# Patient Record
Sex: FEMALE | Race: WHITE | NOT HISPANIC OR LATINO | Employment: OTHER | URBAN - NONMETROPOLITAN AREA
[De-identification: names, ages, dates, MRNs, and addresses within clinical notes are randomized per-mention and may not be internally consistent; named-entity substitution may affect disease eponyms.]

---

## 2017-01-24 ENCOUNTER — OFFICE VISIT (OUTPATIENT)
Dept: FAMILY MEDICINE CLINIC | Facility: CLINIC | Age: 82
End: 2017-01-24

## 2017-01-24 VITALS
HEART RATE: 62 BPM | BODY MASS INDEX: 27.75 KG/M2 | HEIGHT: 61 IN | SYSTOLIC BLOOD PRESSURE: 136 MMHG | WEIGHT: 147 LBS | DIASTOLIC BLOOD PRESSURE: 60 MMHG

## 2017-01-24 DIAGNOSIS — F41.1 GENERALIZED ANXIETY DISORDER: Chronic | ICD-10-CM

## 2017-01-24 DIAGNOSIS — D50.8 IRON DEFICIENCY ANEMIA SECONDARY TO INADEQUATE DIETARY IRON INTAKE: Chronic | ICD-10-CM

## 2017-01-24 DIAGNOSIS — K21.9 GASTROESOPHAGEAL REFLUX DISEASE WITHOUT ESOPHAGITIS: Chronic | ICD-10-CM

## 2017-01-24 DIAGNOSIS — E78.5 HYPERLIPIDEMIA: ICD-10-CM

## 2017-01-24 DIAGNOSIS — M15.9 PRIMARY OSTEOARTHRITIS INVOLVING MULTIPLE JOINTS: Chronic | ICD-10-CM

## 2017-01-24 DIAGNOSIS — E78.2 MIXED HYPERLIPIDEMIA: Chronic | ICD-10-CM

## 2017-01-24 DIAGNOSIS — I10 ESSENTIAL HYPERTENSION: Primary | Chronic | ICD-10-CM

## 2017-01-24 PROBLEM — H40.9 GLAUCOMA: Chronic | Status: ACTIVE | Noted: 2017-01-24

## 2017-01-24 PROBLEM — H35.30 MACULAR DEGENERATION: Chronic | Status: ACTIVE | Noted: 2017-01-24

## 2017-01-24 PROCEDURE — 99214 OFFICE O/P EST MOD 30 MIN: CPT | Performed by: INTERNAL MEDICINE

## 2017-01-24 RX ORDER — ROSUVASTATIN CALCIUM 5 MG/1
TABLET, COATED ORAL
Qty: 30 TABLET | Refills: 5 | Status: SHIPPED | OUTPATIENT
Start: 2017-01-24 | End: 2017-04-10 | Stop reason: SDUPTHER

## 2017-01-24 RX ORDER — DORZOLAMIDE HCL 20 MG/ML
1 SOLUTION/ DROPS OPHTHALMIC 2 TIMES DAILY
Refills: 4 | COMMUNITY
Start: 2017-01-19 | End: 2017-07-24

## 2017-01-24 RX ORDER — LISINOPRIL 10 MG/1
10 TABLET ORAL DAILY
Qty: 30 TABLET | Refills: 5 | Status: SHIPPED | OUTPATIENT
Start: 2017-01-24 | End: 2017-06-22 | Stop reason: SDUPTHER

## 2017-01-24 NOTE — PROGRESS NOTES
Subjective   Emily Valdez is a 89 y.o. female who presents to the office for follow-up and review of labs. She has hypertension and her blood pressure has been controlled. She has hyperlipidemia and takes Crestor daily. She has GERD and takes Protonix and Zantac.  She has frequent episodes of nausea and increased flatulence. She takes Phenergan and Librax as needed, and this tends to help with her GI symptoms.  She has macular degeneration and continues to lose her eyesight.  This often causes a lot of stress and anxiety.  She has iron deficiency anemia, but does not currently take an iron supplement.  She has osteoarthritis and takes over-the-counter NSAIDs as needed.    History of Present Illness     The following portions of the patient's history were reviewed and updated as appropriate: allergies, current medications, past family history, past medical history, past social history, past surgical history and problem list.    Review of Systems   Constitutional: Negative for chills, fatigue and fever.   HENT: Negative for congestion, sneezing, sore throat and trouble swallowing.    Eyes: Negative for visual disturbance.   Respiratory: Negative for cough, chest tightness, shortness of breath and wheezing.    Cardiovascular: Negative for chest pain, palpitations and leg swelling.   Gastrointestinal: Positive for nausea. Negative for constipation, diarrhea and vomiting.   Genitourinary: Negative for dysuria, frequency and urgency.   Musculoskeletal: Negative for neck pain.   Skin: Negative for rash.   Neurological: Negative for dizziness, weakness and headaches.   Psychiatric/Behavioral:        Patient denies any feelings of depression and has not felt down, hopeless or lost interest in any activities.       Objective   Physical Exam   Constitutional: She is oriented to person, place, and time. She appears well-developed and well-nourished.   HENT:   Head: Normocephalic and atraumatic.   Nose: Nose normal.    Mouth/Throat: Oropharynx is clear and moist. No oropharyngeal exudate.   Eyes: Conjunctivae and EOM are normal. Pupils are equal, round, and reactive to light. Right eye exhibits no discharge. Left eye exhibits no discharge. No scleral icterus.   She is wearing dark sunglasses secondary to her macular degeneration and glaucoma   Neck: Normal range of motion. Neck supple. No thyromegaly present.   Cardiovascular: Normal rate, regular rhythm, normal heart sounds and intact distal pulses.  Exam reveals no gallop and no friction rub.    No murmur heard.  Pulmonary/Chest: Effort normal and breath sounds normal. No respiratory distress. She has no wheezes. She has no rales.   Abdominal: Soft. Bowel sounds are normal. She exhibits no distension. There is no tenderness. There is no rebound and no guarding.   Musculoskeletal: She exhibits no edema.   Lymphadenopathy:     She has no cervical adenopathy.   Neurological: She is alert and oriented to person, place, and time. No cranial nerve deficit. Gait abnormal.   Patient walks with the aid of a walker   Skin: Skin is warm and dry. No rash noted.   Psychiatric: She has a normal mood and affect. Her behavior is normal. Judgment and thought content normal.   Nursing note and vitals reviewed.      Assessment/Plan   Emily was seen today for hypertension.    Diagnoses and all orders for this visit:    Essential hypertension  -     lisinopril (PRINIVIL,ZESTRIL) 10 MG tablet; Take 1 tablet by mouth Daily.  -     CBC & Differential; Future  -     Comprehensive Metabolic Panel; Future  -     T4, Free; Future  -     TSH; Future  -     Urinalysis With / Culture If Indicated; Future    Mixed hyperlipidemia  -     LDL Cholesterol, Direct; Future    Gastroesophageal reflux disease without esophagitis    Generalized anxiety disorder    Primary osteoarthritis involving multiple joints    Iron deficiency anemia secondary to inadequate dietary iron intake         Labs are reviewed with  patient.  Her anemia is stable.  I have recommended taking an over-the-counter iron supplement.  Her lipids are controlled and she will continue with Crestor.  Her blood pressure is controlled.  She will continue with Phenergan and Librax as needed for her GI symptoms.  She will also continue with Zantac and Protonix for her GERD symptoms. She will continue with Tylenol or other OTC NSAID's as needed for her arthritic pain.    PHQ-9 Depression Screen was performed on patient with a score of 7.    The patient also states that she will be getting new hearing aids in the future.  She is needing medical clearance for this.  I have asked her to have the audiologist fax me the medical clearance form.  I will then complete it and fax it back to the hearing aid center.      Hospital Outpatient Visit on 01/19/2017   Component Date Value Ref Range Status   • WBC 01/19/2017 7.1  3.2 - 9.8 x1000/uL Final   • RBC 01/19/2017 3.77  3.77 - 5.16 ju/mm3 Final   • Hemoglobin 01/19/2017 11.4* 12.0 - 15.5 gm/dl Final   • Hematocrit 01/19/2017 35.0  35.0 - 45.0 % Final   • MCV 01/19/2017 92.8  80.0 - 98.0 fl Final   • MCH 01/19/2017 30.2  26.0 - 34.0 pg Final   • MCHC 01/19/2017 32.6  31.4 - 36.0 gm/dl Final   • Platelets 01/19/2017 179  150 - 450 x1000/mm3 Final   • RDW 01/19/2017 13.8  11.5 - 14.5 % Final   • MPV 01/19/2017 11.3  8.0 - 12.0 fl Final   • Neutrophil Rel % 01/19/2017 51.4  37.0 - 80.0 % Final   • Lymphocyte Rel % 01/19/2017 31.6  10.0 - 50.0 % Final   • Monocyte Rel % 01/19/2017 8.6  0.0 - 12.0 % Final   • Eosinophil Rel % 01/19/2017 7.7* 0.0 - 7.0 % Final   • Basophil Rel % 01/19/2017 0.7  0.0 - 2.0 % Final   • nRBC 01/19/2017 0   Final   • nRBC 01/19/2017 0   Final   • Color, UA 01/19/2017 Yellow* STRAW,YELLOW,DK YELLOW,ALLAN Final   • Appearance 01/19/2017 Turbid* CLEAR Final   • Glucose, Urine 01/19/2017 NEGATIVE  NEGATIVE mg/dl Final   • Ketones, UA 01/19/2017 NEGATIVE  NEGATIVE Final   • Specific [place], UA  01/19/2017 1.020  1.003 - 1.030 Final   • Blood, UA 01/19/2017 TRACE* NEGATIVE Final   • pH, UA 01/19/2017 7.5* 5.5 - 8.0 pH Units Final    pH Normal: 5.0 - 9.0    • Protein, UA 01/19/2017 NEGATIVE  NEGATIVE Final   • Urobilinogen, UA 01/19/2017 0.2  0.2 EU/dl Final   • Nitrite, UA 01/19/2017 POSITIVE* NEGATIVE Final   • Leukocytes, UA 01/19/2017 2+* NEGATIVE Final   • WBC, UA 01/19/2017 31-50* NONE SEEN,0-2,3-5  /HPF Final   • RBC, UA 01/19/2017 3-5  NONE SEEN,0-2,3-5  /HPF Final   • Bacteria, UA 01/19/2017 2+* NONE SEEN  /HPF Final   • Mucus, UA 01/19/2017 NEGATIVE   Final   • Total Cholesterol 01/19/2017 130* 150 - 200 mg/dl Final    CHOL DESIRED: < 200 MG/DL   • Triglycerides 01/19/2017 96  35 - 160 mg/dl Final    TRIG DESIRED: < 200 MG/DL   • HDL Cholesterol 01/19/2017 52.6  35.0 - 100.0 mg/dl Final    HDL AVERAGE RISK: 35 - 60 MG/DL   • LDL Cholesterol  01/19/2017 58  mg/dl Final    Comment: LDL DESIRED: < 130 MG/DL  LDL DESIRED: < 130 MG/DL  LDL DESIRED: < 130 MG/DL     • Glucose 01/19/2017 97  70.0 - 100.0 mg/dl Final   • BUN 01/19/2017 25  8.0 - 25.0 mg/dl Final   • Creatinine 01/19/2017 1.2  0.4 - 1.3 mg/dl Final   • Sodium 01/19/2017 141.0  134 - 146 mmol/L Final   • Potassium 01/19/2017 5.2  3.4 - 5.4 mmol/L Final   • Chloride 01/19/2017 106.0  100.0 - 112.0 mmol/L Final   • CO2 01/19/2017 28.0  20.0 - 32.0 mmol/L Final   • Calcium 01/19/2017 9.4  8.4 - 10.8 mg/dl Final   • Total Protein 01/19/2017 7.1  6.7 - 8.2 gm/dl Final   • Albumin 01/19/2017 3.9  3.2 - 5.5 gm/dl Final   • Total Bilirubin 01/19/2017 0.7  0.2 - 1.0 mg/dl Final   • Alkaline Phosphatase 01/19/2017 57  15 - 121 U/L Final   • ALT (SGPT) 01/19/2017 11  10 - 60 U/L Final   • AST (SGOT) 01/19/2017 21  10 - 60 U/L Final   • GFR MDRD Non  01/19/2017 42  39 - 90 mL/min/1.73 sq.M Final    Comment: Invalid if creatinine is changing or the patient is on dialysis. Use AA  result if patient is -American, non AA result  otherwise.     • GFR MDRD  01/19/2017 51  39 - 90 mL/min/1.73 sq.M Final   • Anion Gap 01/19/2017 7.0  5.0 - 15.0 mmol/L Final   Hospital Outpatient Visit on 01/19/2017   Component Date Value Ref Range Status   • Free T4 01/19/2017 1.09  0.78 - 2.19 ng/dl Final   • TSH 01/19/2017 1.84  0.46 - 4.68 uIU/ml Final   ]

## 2017-01-24 NOTE — MR AVS SNAPSHOT
Emily Valdez   1/24/2017 11:30 AM   Office Visit    Dept Phone:  [phone]   Encounter #:  [id number]    Provider:  Vaibhav Mai MD   Department:  [facility]                Your Full Care Plan              Today's Medication Changes          These changes are accurate as of: 1/24/17 12:26 PM.  If you have any questions, ask your nurse or doctor.               Medication(s)that have changed:     rosuvastatin 5 MG tablet   Commonly known as:  CRESTOR   TAKE 1 TABLET BY MOUTH EVERY EVENING   What changed:  See the new instructions.   Changed by:  Vaibhav Mai MD         Stop taking medication(s)listed here:     acetaminophen 500 MG tablet   Commonly known as:  TYLENOL   Stopped by:  Vaibhav Mai MD           BIOTIN PO   Stopped by:  Vaibhav Mai MD                Where to Get Your Medications      These medications [facility] sent to [facility] [address] - [phone] [facility] [phone]   [address] [facility] [address]     Phone:  [phone]     lisinopril 10 MG tablet    rosuvastatin 5 MG tablet                  Your Updated Medication List          This list is accurate as of: 1/24/17 12:26 PM.  Always use your most recent med list.                clidinium-chlordiazePOXIDE 5-2.5 MG per capsule   Commonly known as:  LIBRAX   Take 1 capsule by mouth two times daily       COMBIGAN 0.2-0.5 % ophthalmic solution   Generic drug:  brimonidine-timolol       dorzolamide 2 % ophthalmic solution   Commonly known as:  TRUSOPT       Fish Oil oil       lisinopril 10 MG tablet   Commonly known as:  PRINIVIL,ZESTRIL   Take 1 tablet by mouth Daily.       LUMIGAN 0.01 % ophthalmic drops   Generic drug:  bimatoprost       magnesium oxide 400 (241.3 MG) MG tablet tablet   Commonly known as:  MAGOX   Take 1 tablet by mouth 2 (two) times a day.       metoprolol succinate XL 25 MG 24 hr tablet   Commonly known  as:  TOPROL-XL   Take 1 tablet by mouth daily.       pantoprazole 40 MG EC tablet   Commonly known as:  PROTONIX   Take 1 tablet by mouth daily.       promethazine 25 MG tablet   Commonly known as:  PHENERGAN   TAKE 1 TABLET BY MOUTH EVERY 6 HOURS AS NEEDED FOR NAUSEA       raNITIdine 150 MG tablet   Commonly known as:  ZANTAC   Take 1 tablet by mouth 2 (two) times a day.       rosuvastatin 5 MG tablet   Commonly known as:  CRESTOR   TAKE 1 TABLET BY MOUTH EVERY EVENING               You Were Diagnosed With        Codes Comments    Essential hypertension    -  Primary ICD-10-CM: I10  ICD-9-CM: 401.9     Mixed hyperlipidemia     ICD-10-CM: E78.2  ICD-9-CM: 272.2     Gastroesophageal reflux disease without esophagitis     ICD-10-CM: K21.9  ICD-9-CM: 530.81     Generalized anxiety disorder     ICD-10-CM: F41.1  ICD-9-CM: 300.02     Primary osteoarthritis involving multiple joints     ICD-10-CM: M15.0  ICD-9-CM: 715.09     Iron deficiency anemia secondary to inadequate dietary iron intake     ICD-10-CM: D50.8  ICD-9-CM: 280.1       Instructions     None    Patient Instructions History      Upcoming Appointments     Visit Type Date Time Department    FOLLOW UP 1/24/2017 11:30 AM [facility]    FOLLOW UP 7/24/2017  1:00 PM [facility]      MyChart Signup     Our records indicate that you have declined [facility]. If you would like to sign up for [employer], please email [email or web address] or call [phone] to obtain an activation code.             Other Info from Your Visit           Your Appointments     Jul 24, 2017  1:00 PM CDT   Follow Up with Vaibhav Mai MD   [facility] (--)    [address] [place] [address] [address] [place] [address] [phone]           Arrive 15 minutes prior to appointment.              Allergies     Codeine      Levaquin [Levofloxacin]      Sulfa Antibiotics        Reason for Visit     Hypertension           Vital Signs      "Blood Pressure Pulse Height Weight Body Mass Index Smoking Status    136/60 (BP Location: Left arm, Patient Position: Sitting, Cuff Size: Adult) 62 61\" (154.9 cm) 147 lb (66.7 kg) 27.78 kg/m2 Former Smoker      Problems and Diagnoses Noted     High blood pressure    Acid reflux disease    Generalized anxiety disorder    Glaucoma (increased eye pressure)    Iron deficiency anemia secondary to inadequate dietary iron intake    Macular degeneration    Mixed hyperlipidemia    Primary osteoarthritis involving multiple joints      No Longer an Issue     Restless leg syndrome        "

## 2017-04-10 DIAGNOSIS — E78.5 HYPERLIPIDEMIA, UNSPECIFIED HYPERLIPIDEMIA TYPE: ICD-10-CM

## 2017-04-10 RX ORDER — ROSUVASTATIN CALCIUM 5 MG/1
5 TABLET, COATED ORAL NIGHTLY
Qty: 90 TABLET | Refills: 1 | Status: SHIPPED | OUTPATIENT
Start: 2017-04-10 | End: 2017-11-17 | Stop reason: SDUPTHER

## 2017-05-11 DIAGNOSIS — K21.9 GASTROESOPHAGEAL REFLUX DISEASE WITHOUT ESOPHAGITIS: ICD-10-CM

## 2017-05-11 RX ORDER — RANITIDINE 150 MG/1
TABLET ORAL
Qty: 60 TABLET | Refills: 5 | Status: SHIPPED | OUTPATIENT
Start: 2017-05-11 | End: 2017-07-24 | Stop reason: ALTCHOICE

## 2017-06-22 DIAGNOSIS — I10 ESSENTIAL HYPERTENSION: Chronic | ICD-10-CM

## 2017-06-22 RX ORDER — LISINOPRIL 10 MG/1
10 TABLET ORAL DAILY
Qty: 90 TABLET | Refills: 3 | Status: SHIPPED | OUTPATIENT
Start: 2017-06-22 | End: 2018-03-26 | Stop reason: SDUPTHER

## 2017-06-23 DIAGNOSIS — I10 ESSENTIAL HYPERTENSION: ICD-10-CM

## 2017-06-23 RX ORDER — METOPROLOL SUCCINATE 25 MG/1
25 TABLET, EXTENDED RELEASE ORAL DAILY
Qty: 30 TABLET | Refills: 5 | Status: SHIPPED | OUTPATIENT
Start: 2017-06-23 | End: 2018-06-25 | Stop reason: SDUPTHER

## 2017-07-19 ENCOUNTER — LAB (OUTPATIENT)
Dept: LAB | Facility: OTHER | Age: 82
End: 2017-07-19

## 2017-07-19 DIAGNOSIS — E78.2 MIXED HYPERLIPIDEMIA: Chronic | ICD-10-CM

## 2017-07-19 DIAGNOSIS — I10 ESSENTIAL HYPERTENSION: ICD-10-CM

## 2017-07-19 LAB
ALBUMIN SERPL-MCNC: 3.8 G/DL (ref 3.2–5.5)
ALBUMIN/GLOB SERPL: 1.2 G/DL (ref 1–3)
ALP SERPL-CCNC: 73 U/L (ref 15–121)
ALT SERPL W P-5'-P-CCNC: 12 U/L (ref 10–60)
ANION GAP SERPL CALCULATED.3IONS-SCNC: 9 MMOL/L (ref 5–15)
ARTICHOKE IGE QN: 58 MG/DL (ref 0–129)
AST SERPL-CCNC: 23 U/L (ref 10–60)
BACTERIA UR QL AUTO: ABNORMAL /HPF
BASOPHILS # BLD AUTO: 0.05 10*3/MM3 (ref 0–0.2)
BASOPHILS NFR BLD AUTO: 0.6 % (ref 0–2)
BILIRUB SERPL-MCNC: 0.5 MG/DL (ref 0.2–1)
BILIRUB UR QL STRIP: NEGATIVE
BUN BLD-MCNC: 25 MG/DL (ref 8–25)
BUN/CREAT SERPL: 27.8 (ref 7–25)
CALCIUM SPEC-SCNC: 9.1 MG/DL (ref 8.4–10.8)
CHLORIDE SERPL-SCNC: 104 MMOL/L (ref 100–112)
CLARITY UR: ABNORMAL
CO2 SERPL-SCNC: 29 MMOL/L (ref 20–32)
COLOR UR: YELLOW
CREAT BLD-MCNC: 0.9 MG/DL (ref 0.4–1.3)
DEPRECATED RDW RBC AUTO: 45.4 FL (ref 36.4–46.3)
EOSINOPHIL # BLD AUTO: 0.95 10*3/MM3 (ref 0–0.7)
EOSINOPHIL NFR BLD AUTO: 11.8 % (ref 0–7)
ERYTHROCYTE [DISTWIDTH] IN BLOOD BY AUTOMATED COUNT: 13.8 % (ref 11.5–14.5)
GFR SERPL CREATININE-BSD FRML MDRD: 59 ML/MIN/1.73 (ref 39–90)
GLOBULIN UR ELPH-MCNC: 3.2 GM/DL (ref 2.5–4.6)
GLUCOSE BLD-MCNC: 94 MG/DL (ref 70–100)
GLUCOSE UR STRIP-MCNC: NEGATIVE MG/DL
HCT VFR BLD AUTO: 32.6 % (ref 35–45)
HGB BLD-MCNC: 10.5 G/DL (ref 12–15.5)
HGB UR QL STRIP.AUTO: NEGATIVE
HYALINE CASTS UR QL AUTO: ABNORMAL /LPF
KETONES UR QL STRIP: NEGATIVE
LEUKOCYTE ESTERASE UR QL STRIP.AUTO: ABNORMAL
LYMPHOCYTES # BLD AUTO: 2.32 10*3/MM3 (ref 0.6–4.2)
LYMPHOCYTES NFR BLD AUTO: 28.9 % (ref 10–50)
MCH RBC QN AUTO: 30.3 PG (ref 26.5–34)
MCHC RBC AUTO-ENTMCNC: 32.2 G/DL (ref 31.4–36)
MCV RBC AUTO: 93.9 FL (ref 80–98)
MONOCYTES # BLD AUTO: 0.68 10*3/MM3 (ref 0–0.9)
MONOCYTES NFR BLD AUTO: 8.5 % (ref 0–12)
NEUTROPHILS # BLD AUTO: 4.02 10*3/MM3 (ref 2–8.6)
NEUTROPHILS NFR BLD AUTO: 50.2 % (ref 37–80)
NITRITE UR QL STRIP: NEGATIVE
PH UR STRIP.AUTO: 8 [PH] (ref 5.5–8)
PLATELET # BLD AUTO: 166 10*3/MM3 (ref 150–450)
PMV BLD AUTO: 11.7 FL (ref 8–12)
POTASSIUM BLD-SCNC: 4.9 MMOL/L (ref 3.4–5.4)
PROT SERPL-MCNC: 7 G/DL (ref 6.7–8.2)
PROT UR QL STRIP: ABNORMAL
RBC # BLD AUTO: 3.47 10*6/MM3 (ref 3.77–5.16)
RBC # UR: ABNORMAL /HPF
REF LAB TEST METHOD: ABNORMAL
RENAL EPI CELLS #/AREA URNS HPF: ABNORMAL /HPF
SODIUM BLD-SCNC: 142 MMOL/L (ref 134–146)
SP GR UR STRIP: 1.02 (ref 1–1.03)
SQUAMOUS #/AREA URNS HPF: ABNORMAL /HPF
UROBILINOGEN UR QL STRIP: ABNORMAL
WBC NRBC COR # BLD: 8.02 10*3/MM3 (ref 3.2–9.8)
WBC UR QL AUTO: ABNORMAL /HPF

## 2017-07-19 PROCEDURE — 81001 URINALYSIS AUTO W/SCOPE: CPT | Performed by: INTERNAL MEDICINE

## 2017-07-19 PROCEDURE — 85025 COMPLETE CBC W/AUTO DIFF WBC: CPT | Performed by: INTERNAL MEDICINE

## 2017-07-19 PROCEDURE — 83721 ASSAY OF BLOOD LIPOPROTEIN: CPT | Performed by: INTERNAL MEDICINE

## 2017-07-19 PROCEDURE — 84443 ASSAY THYROID STIM HORMONE: CPT | Performed by: INTERNAL MEDICINE

## 2017-07-19 PROCEDURE — 84439 ASSAY OF FREE THYROXINE: CPT | Performed by: INTERNAL MEDICINE

## 2017-07-19 PROCEDURE — 36415 COLL VENOUS BLD VENIPUNCTURE: CPT | Performed by: INTERNAL MEDICINE

## 2017-07-19 PROCEDURE — 80053 COMPREHEN METABOLIC PANEL: CPT | Performed by: INTERNAL MEDICINE

## 2017-07-19 PROCEDURE — 87086 URINE CULTURE/COLONY COUNT: CPT | Performed by: INTERNAL MEDICINE

## 2017-07-20 LAB
T4 FREE SERPL-MCNC: 1.16 NG/DL (ref 0.78–2.19)
TSH SERPL DL<=0.05 MIU/L-ACNC: 2.19 MIU/ML (ref 0.46–4.68)

## 2017-07-21 LAB — BACTERIA SPEC AEROBE CULT: NORMAL

## 2017-07-24 ENCOUNTER — OFFICE VISIT (OUTPATIENT)
Dept: FAMILY MEDICINE CLINIC | Facility: CLINIC | Age: 82
End: 2017-07-24

## 2017-07-24 VITALS
HEIGHT: 61 IN | SYSTOLIC BLOOD PRESSURE: 130 MMHG | WEIGHT: 148 LBS | HEART RATE: 76 BPM | BODY MASS INDEX: 27.94 KG/M2 | TEMPERATURE: 97.8 F | DIASTOLIC BLOOD PRESSURE: 64 MMHG

## 2017-07-24 DIAGNOSIS — K21.9 GASTROESOPHAGEAL REFLUX DISEASE WITHOUT ESOPHAGITIS: Chronic | ICD-10-CM

## 2017-07-24 DIAGNOSIS — D50.8 IRON DEFICIENCY ANEMIA SECONDARY TO INADEQUATE DIETARY IRON INTAKE: Chronic | ICD-10-CM

## 2017-07-24 DIAGNOSIS — I10 ESSENTIAL HYPERTENSION: Primary | Chronic | ICD-10-CM

## 2017-07-24 DIAGNOSIS — F41.1 GENERALIZED ANXIETY DISORDER: Chronic | ICD-10-CM

## 2017-07-24 DIAGNOSIS — E78.2 MIXED HYPERLIPIDEMIA: Chronic | ICD-10-CM

## 2017-07-24 PROCEDURE — 99214 OFFICE O/P EST MOD 30 MIN: CPT | Performed by: INTERNAL MEDICINE

## 2017-07-24 RX ORDER — ASPIRIN 81 MG/1
TABLET ORAL
COMMUNITY

## 2017-07-24 RX ORDER — LANOLIN ALCOHOL/MO/W.PET/CERES
325 CREAM (GRAM) TOPICAL
Qty: 30 TABLET | Refills: 11 | Status: SHIPPED | OUTPATIENT
Start: 2017-07-24 | End: 2018-08-21 | Stop reason: SDUPTHER

## 2017-07-24 RX ORDER — CALCIUM CARBONATE 200(500)MG
1 TABLET,CHEWABLE ORAL NIGHTLY
COMMUNITY

## 2017-11-02 DIAGNOSIS — K21.9 GASTROESOPHAGEAL REFLUX DISEASE WITHOUT ESOPHAGITIS: ICD-10-CM

## 2017-11-02 RX ORDER — CHLORDIAZEPOXIDE HYDROCHLORIDE AND CLIDINIUM BROMIDE 5; 2.5 MG/1; MG/1
CAPSULE ORAL
Qty: 60 CAPSULE | Refills: 5 | Status: CANCELLED | OUTPATIENT
Start: 2017-11-02

## 2017-11-07 DIAGNOSIS — K21.9 GASTROESOPHAGEAL REFLUX DISEASE WITHOUT ESOPHAGITIS: ICD-10-CM

## 2017-11-07 RX ORDER — CHLORDIAZEPOXIDE HYDROCHLORIDE AND CLIDINIUM BROMIDE 5; 2.5 MG/1; MG/1
CAPSULE ORAL
Qty: 60 CAPSULE | Refills: 5 | Status: SHIPPED | OUTPATIENT
Start: 2017-11-07 | End: 2017-11-13 | Stop reason: SDUPTHER

## 2017-11-13 DIAGNOSIS — K21.9 GASTROESOPHAGEAL REFLUX DISEASE WITHOUT ESOPHAGITIS: ICD-10-CM

## 2017-11-13 RX ORDER — CHLORDIAZEPOXIDE HYDROCHLORIDE AND CLIDINIUM BROMIDE 5; 2.5 MG/1; MG/1
CAPSULE ORAL
Qty: 60 CAPSULE | Refills: 0 | Status: SHIPPED | OUTPATIENT
Start: 2017-11-13 | End: 2017-12-20 | Stop reason: SDUPTHER

## 2017-11-17 DIAGNOSIS — E78.5 HYPERLIPIDEMIA, UNSPECIFIED HYPERLIPIDEMIA TYPE: ICD-10-CM

## 2017-11-17 RX ORDER — ROSUVASTATIN CALCIUM 5 MG/1
TABLET, COATED ORAL
Qty: 90 TABLET | Refills: 1 | Status: SHIPPED | OUTPATIENT
Start: 2017-11-17 | End: 2018-06-05 | Stop reason: SDUPTHER

## 2017-12-20 DIAGNOSIS — K21.9 GASTROESOPHAGEAL REFLUX DISEASE WITHOUT ESOPHAGITIS: ICD-10-CM

## 2017-12-26 RX ORDER — CHLORDIAZEPOXIDE HYDROCHLORIDE AND CLIDINIUM BROMIDE 5; 2.5 MG/1; MG/1
CAPSULE ORAL
Qty: 60 CAPSULE | Refills: 2 | Status: SHIPPED | OUTPATIENT
Start: 2017-12-26 | End: 2018-02-28 | Stop reason: SDUPTHER

## 2018-01-31 ENCOUNTER — LAB (OUTPATIENT)
Dept: LAB | Facility: OTHER | Age: 83
End: 2018-01-31

## 2018-01-31 DIAGNOSIS — D50.8 IRON DEFICIENCY ANEMIA SECONDARY TO INADEQUATE DIETARY IRON INTAKE: ICD-10-CM

## 2018-01-31 DIAGNOSIS — I10 ESSENTIAL HYPERTENSION: Chronic | ICD-10-CM

## 2018-01-31 DIAGNOSIS — E78.2 MIXED HYPERLIPIDEMIA: Chronic | ICD-10-CM

## 2018-01-31 LAB
ALBUMIN SERPL-MCNC: 3.8 G/DL (ref 3.2–5.5)
ALBUMIN/GLOB SERPL: 1.2 G/DL (ref 1–3)
ALP SERPL-CCNC: 67 U/L (ref 15–121)
ALT SERPL W P-5'-P-CCNC: 13 U/L (ref 10–60)
ANION GAP SERPL CALCULATED.3IONS-SCNC: 6 MMOL/L (ref 5–15)
AST SERPL-CCNC: 27 U/L (ref 10–60)
BASOPHILS # BLD AUTO: 0.09 10*3/MM3 (ref 0–0.2)
BASOPHILS NFR BLD AUTO: 1.3 % (ref 0–2)
BILIRUB SERPL-MCNC: 0.7 MG/DL (ref 0.2–1)
BILIRUB UR QL STRIP: NEGATIVE
BUN BLD-MCNC: 29 MG/DL (ref 8–25)
BUN/CREAT SERPL: 26.4 (ref 7–25)
CALCIUM SPEC-SCNC: 9.2 MG/DL (ref 8.4–10.8)
CHLORIDE SERPL-SCNC: 106 MMOL/L (ref 100–112)
CHOLEST SERPL-MCNC: 126 MG/DL (ref 150–200)
CLARITY UR: ABNORMAL
CO2 SERPL-SCNC: 29 MMOL/L (ref 20–32)
COLOR UR: YELLOW
CREAT BLD-MCNC: 1.1 MG/DL (ref 0.4–1.3)
DEPRECATED RDW RBC AUTO: 44.9 FL (ref 36.4–46.3)
EOSINOPHIL # BLD AUTO: 0.52 10*3/MM3 (ref 0–0.7)
EOSINOPHIL NFR BLD AUTO: 7.7 % (ref 0–7)
ERYTHROCYTE [DISTWIDTH] IN BLOOD BY AUTOMATED COUNT: 13.5 % (ref 11.5–14.5)
GFR SERPL CREATININE-BSD FRML MDRD: 46 ML/MIN/1.73 (ref 39–90)
GLOBULIN UR ELPH-MCNC: 3.2 GM/DL (ref 2.5–4.6)
GLUCOSE BLD-MCNC: 98 MG/DL (ref 70–100)
GLUCOSE UR STRIP-MCNC: NEGATIVE MG/DL
HCT VFR BLD AUTO: 34.1 % (ref 35–45)
HDLC SERPL-MCNC: 57 MG/DL (ref 35–100)
HGB BLD-MCNC: 11 G/DL (ref 12–15.5)
HGB UR QL STRIP.AUTO: NEGATIVE
KETONES UR QL STRIP: NEGATIVE
LDLC SERPL CALC-MCNC: 53 MG/DL
LDLC/HDLC SERPL: 0.93 {RATIO}
LEUKOCYTE ESTERASE UR QL STRIP.AUTO: NEGATIVE
LYMPHOCYTES # BLD AUTO: 2.13 10*3/MM3 (ref 0.6–4.2)
LYMPHOCYTES NFR BLD AUTO: 31.5 % (ref 10–50)
MCH RBC QN AUTO: 30.2 PG (ref 26.5–34)
MCHC RBC AUTO-ENTMCNC: 32.3 G/DL (ref 31.4–36)
MCV RBC AUTO: 93.7 FL (ref 80–98)
MONOCYTES # BLD AUTO: 0.65 10*3/MM3 (ref 0–0.9)
MONOCYTES NFR BLD AUTO: 9.6 % (ref 0–12)
NEUTROPHILS # BLD AUTO: 3.38 10*3/MM3 (ref 2–8.6)
NEUTROPHILS NFR BLD AUTO: 49.9 % (ref 37–80)
NITRITE UR QL STRIP: NEGATIVE
PH UR STRIP.AUTO: 7 [PH] (ref 5.5–8)
PLATELET # BLD AUTO: 157 10*3/MM3 (ref 150–450)
PMV BLD AUTO: 11.4 FL (ref 8–12)
POTASSIUM BLD-SCNC: 5.6 MMOL/L (ref 3.4–5.4)
PROT SERPL-MCNC: 7 G/DL (ref 6.7–8.2)
PROT UR QL STRIP: ABNORMAL
RBC # BLD AUTO: 3.64 10*6/MM3 (ref 3.77–5.16)
SODIUM BLD-SCNC: 141 MMOL/L (ref 134–146)
SP GR UR STRIP: 1.02 (ref 1–1.03)
TRIGL SERPL-MCNC: 81 MG/DL (ref 35–160)
UROBILINOGEN UR QL STRIP: ABNORMAL
VLDLC SERPL-MCNC: 16.2 MG/DL
WBC NRBC COR # BLD: 6.77 10*3/MM3 (ref 3.2–9.8)

## 2018-01-31 PROCEDURE — 84443 ASSAY THYROID STIM HORMONE: CPT | Performed by: INTERNAL MEDICINE

## 2018-01-31 PROCEDURE — 84439 ASSAY OF FREE THYROXINE: CPT | Performed by: INTERNAL MEDICINE

## 2018-01-31 PROCEDURE — 36415 COLL VENOUS BLD VENIPUNCTURE: CPT | Performed by: INTERNAL MEDICINE

## 2018-01-31 PROCEDURE — 80053 COMPREHEN METABOLIC PANEL: CPT | Performed by: INTERNAL MEDICINE

## 2018-01-31 PROCEDURE — 85025 COMPLETE CBC W/AUTO DIFF WBC: CPT | Performed by: INTERNAL MEDICINE

## 2018-01-31 PROCEDURE — 81003 URINALYSIS AUTO W/O SCOPE: CPT | Performed by: INTERNAL MEDICINE

## 2018-01-31 PROCEDURE — 80061 LIPID PANEL: CPT | Performed by: INTERNAL MEDICINE

## 2018-02-01 LAB
T4 FREE SERPL-MCNC: 1.05 NG/DL (ref 0.78–2.19)
TSH SERPL DL<=0.05 MIU/L-ACNC: 1.85 MIU/ML (ref 0.46–4.68)

## 2018-02-02 DIAGNOSIS — E87.5 SERUM POTASSIUM ELEVATED: Primary | ICD-10-CM

## 2018-02-05 ENCOUNTER — OFFICE VISIT (OUTPATIENT)
Dept: FAMILY MEDICINE CLINIC | Facility: CLINIC | Age: 83
End: 2018-02-05

## 2018-02-05 ENCOUNTER — LAB (OUTPATIENT)
Dept: LAB | Facility: OTHER | Age: 83
End: 2018-02-05

## 2018-02-05 VITALS
BODY MASS INDEX: 28.7 KG/M2 | HEART RATE: 76 BPM | DIASTOLIC BLOOD PRESSURE: 76 MMHG | WEIGHT: 152 LBS | HEIGHT: 61 IN | SYSTOLIC BLOOD PRESSURE: 138 MMHG

## 2018-02-05 DIAGNOSIS — E78.2 MIXED HYPERLIPIDEMIA: Chronic | ICD-10-CM

## 2018-02-05 DIAGNOSIS — M15.9 PRIMARY OSTEOARTHRITIS INVOLVING MULTIPLE JOINTS: Chronic | ICD-10-CM

## 2018-02-05 DIAGNOSIS — K21.9 GASTROESOPHAGEAL REFLUX DISEASE WITHOUT ESOPHAGITIS: Chronic | ICD-10-CM

## 2018-02-05 DIAGNOSIS — I10 ESSENTIAL HYPERTENSION: Primary | Chronic | ICD-10-CM

## 2018-02-05 DIAGNOSIS — E87.5 SERUM POTASSIUM ELEVATED: ICD-10-CM

## 2018-02-05 DIAGNOSIS — E87.5 HYPERKALEMIA: ICD-10-CM

## 2018-02-05 DIAGNOSIS — D50.8 IRON DEFICIENCY ANEMIA SECONDARY TO INADEQUATE DIETARY IRON INTAKE: Chronic | ICD-10-CM

## 2018-02-05 LAB — POTASSIUM BLD-SCNC: 4.8 MMOL/L (ref 3.4–5.4)

## 2018-02-05 PROCEDURE — 84132 ASSAY OF SERUM POTASSIUM: CPT | Performed by: INTERNAL MEDICINE

## 2018-02-05 PROCEDURE — 99214 OFFICE O/P EST MOD 30 MIN: CPT | Performed by: INTERNAL MEDICINE

## 2018-02-05 PROCEDURE — 36415 COLL VENOUS BLD VENIPUNCTURE: CPT | Performed by: INTERNAL MEDICINE

## 2018-02-05 RX ORDER — DOCUSATE SODIUM 100 MG/1
100 CAPSULE, LIQUID FILLED ORAL NIGHTLY
COMMUNITY

## 2018-02-05 NOTE — PROGRESS NOTES
"Chief Complaint   Patient presents with   • Hypertension   • Hyperlipidemia     Subjective   Emily Valdez is a 89 y.o. female who presents to the office for follow-up and review of labs. She has hypertension and her blood pressure has been controlled. She has hyperlipidemia and takes Crestor daily. She has A history of GERD, but does not currently take routine medication for this.  She uses Tums as needed. She has macular degeneration and continues to have worsening of her eyesight.  This often causes a lot of stress and anxiety.  She has iron deficiency anemia, and takes a daily multivitamin with iron.  She has osteoarthritis and takes over-the-counter NSAIDs as needed.     The following portions of the patient's history were reviewed and updated as appropriate: allergies, current medications, past family history, past medical history, past social history, past surgical history and problem list.    Review of Systems   Constitutional: Negative for chills, fatigue and fever.   HENT: Negative for congestion, sneezing, sore throat and trouble swallowing.    Eyes: Negative for visual disturbance.   Respiratory: Negative for cough, chest tightness, shortness of breath and wheezing.    Cardiovascular: Negative for chest pain, palpitations and leg swelling.   Gastrointestinal: Negative for constipation, diarrhea, nausea and vomiting.   Genitourinary: Negative for dysuria, frequency and urgency.   Musculoskeletal: Negative for neck pain.   Skin: Negative for rash.   Neurological: Negative for dizziness, weakness and headaches.   Psychiatric/Behavioral:        Patient denies any feelings of depression and has not felt down, hopeless or lost interest in any activities.       Objective   Vitals:    02/05/18 1257   BP: 138/76   BP Location: Right arm   Patient Position: Sitting   Cuff Size: Adult   Pulse: 76   Weight: 68.9 kg (152 lb)   Height: 154.9 cm (61\")   PainSc: 0-No pain     Physical Exam   Constitutional: She is " oriented to person, place, and time. She appears well-developed and well-nourished.   HENT:   Head: Normocephalic and atraumatic.   Nose: Nose normal.   Mouth/Throat: Oropharynx is clear and moist. No oropharyngeal exudate.   Eyes: Conjunctivae and EOM are normal. Pupils are equal, round, and reactive to light. Right eye exhibits no discharge. Left eye exhibits no discharge. No scleral icterus.   She is wearing dark sunglasses secondary to her macular degeneration and glaucoma   Neck: Normal range of motion. Neck supple. No thyromegaly present.   Cardiovascular: Normal rate, regular rhythm, normal heart sounds and intact distal pulses.  Exam reveals no gallop and no friction rub.    No murmur heard.  Pulmonary/Chest: Effort normal and breath sounds normal. No respiratory distress. She has no wheezes. She has no rales.   Abdominal: Soft. Bowel sounds are normal. She exhibits no distension. There is no tenderness. There is no rebound and no guarding.   Musculoskeletal: She exhibits no edema.   Lymphadenopathy:     She has no cervical adenopathy.   Neurological: She is alert and oriented to person, place, and time. No cranial nerve deficit. Gait abnormal.   Patient walks with the aid of a walker   Skin: Skin is warm and dry. No rash noted.   Psychiatric: She has a normal mood and affect. Her behavior is normal. Judgment and thought content normal.   Nursing note and vitals reviewed.      Assessment/Plan   Emily was seen today for hypertension and hyperlipidemia.    Diagnoses and all orders for this visit:    Essential hypertension  -     Comprehensive Metabolic Panel; Future  -     T4, Free; Future  -     TSH; Future  -     Urinalysis With / Culture If Indicated - Urine, Clean Catch; Future    Mixed hyperlipidemia  -     LDL Cholesterol, Direct; Future    Iron deficiency anemia secondary to inadequate dietary iron intake  -     CBC & Differential; Future    Primary osteoarthritis involving multiple  joints    Gastroesophageal reflux disease without esophagitis    Hyperkalemia         Labs are reviewed with patient.  Her anemia is stable, and Her hemoglobin has improved from the previous lab.  Initially her potassium was elevated.  She repeated this today, and it is normal.  Her lipids are at goal, and she will continue with Crestor.  Her blood pressure is controlled.  She will continue with Phenergan and Librax as needed for her GI symptoms.   She will continue with Tylenol or other OTC NSAID's as needed for her osteoarthritis.    PHQ-2/PHQ-9 Depression Screening 2/5/2018   Little interest or pleasure in doing things 0   Feeling down, depressed, or hopeless 1   Trouble falling or staying asleep, or sleeping too much 0   Feeling tired or having little energy 2   Poor appetite or overeating 0   Feeling bad about yourself - or that you are a failure or have let yourself or your family down 0   Trouble concentrating on things, such as reading the newspaper or watching television 1   Moving or speaking so slowly that other people could have noticed. Or the opposite - being so fidgety or restless that you have been moving around a lot more than usual 0   Thoughts that you would be better off dead, or of hurting yourself in some way 0   Total Score 4   If you checked off any problems, how difficult have these problems made it for you to do your work, take care of things at home, or get along with other people? Somewhat difficult         Lab on 02/05/2018   Component Date Value Ref Range Status   • Potassium 02/05/2018 4.8  3.4 - 5.4 mmol/L Final   Lab on 01/31/2018   Component Date Value Ref Range Status   • Color, UA 01/31/2018 Yellow  Yellow, Straw Final   • Appearance, UA 01/31/2018 Cloudy* Clear Final   • pH, UA 01/31/2018 7.0  5.5 - 8.0 Final   • Specific [place], UA 01/31/2018 1.020  1.005 - 1.030 Final   • Glucose, UA 01/31/2018 Negative  Negative Final   • Ketones, UA 01/31/2018 Negative  Negative Final   •  Bilirubin, UA 01/31/2018 Negative  Negative Final   • Blood, UA 01/31/2018 Negative  Negative Final   • Protein, UA 01/31/2018 Trace* Negative Final   • Leuk Esterase, UA 01/31/2018 Negative  Negative Final   • Nitrite, UA 01/31/2018 Negative  Negative Final   • Urobilinogen, UA 01/31/2018 0.2 E.U./dL  0.2 - 1.0 E.U./dL Final   • Glucose 01/31/2018 98  70 - 100 mg/dL Final   • BUN 01/31/2018 29* 8 - 25 mg/dL Final   • Creatinine 01/31/2018 1.10  0.40 - 1.30 mg/dL Final   • Sodium 01/31/2018 141  134 - 146 mmol/L Final   • Potassium 01/31/2018 5.6* 3.4 - 5.4 mmol/L Final   • Chloride 01/31/2018 106  100 - 112 mmol/L Final   • CO2 01/31/2018 29.0  20.0 - 32.0 mmol/L Final   • Calcium 01/31/2018 9.2  8.4 - 10.8 mg/dL Final   • Total Protein 01/31/2018 7.0  6.7 - 8.2 g/dL Final   • Albumin 01/31/2018 3.80  3.20 - 5.50 g/dL Final   • ALT (SGPT) 01/31/2018 13  10 - 60 U/L Final   • AST (SGOT) 01/31/2018 27  10 - 60 U/L Final   • Alkaline Phosphatase 01/31/2018 67  15 - 121 U/L Final   • Total Bilirubin 01/31/2018 0.7  0.2 - 1.0 mg/dL Final   • eGFR Non African Amer 01/31/2018 46  39 - 90 mL/min/1.73 Final   • Globulin 01/31/2018 3.2  2.5 - 4.6 gm/dL Final   • A/G Ratio 01/31/2018 1.2  1.0 - 3.0 g/dL Final   • BUN/Creatinine Ratio 01/31/2018 26.4* 7.0 - 25.0 Final   • Anion Gap 01/31/2018 6.0  5.0 - 15.0 mmol/L Final   • Total Cholesterol 01/31/2018 126* 150 - 200 mg/dL Final   • Triglycerides 01/31/2018 81  35 - 160 mg/dL Final   • HDL Cholesterol 01/31/2018 57  35 - 100 mg/dL Final   • LDL Cholesterol  01/31/2018 53  mg/dL Final   • VLDL Cholesterol 01/31/2018 16.2  mg/dL Final   • LDL/HDL Ratio 01/31/2018 0.93   Final   • Free T4 01/31/2018 1.05  0.78 - 2.19 ng/dL Final   • TSH 01/31/2018 1.850  0.460 - 4.680 mIU/mL Final   • WBC 01/31/2018 6.77  3.20 - 9.80 10*3/mm3 Final   • RBC 01/31/2018 3.64* 3.77 - 5.16 10*6/mm3 Final   • Hemoglobin 01/31/2018 11.0* 12.0 - 15.5 g/dL Final   • Hematocrit 01/31/2018 34.1* 35.0 -  45.0 % Final   • MCV 01/31/2018 93.7  80.0 - 98.0 fL Final   • MCH 01/31/2018 30.2  26.5 - 34.0 pg Final   • MCHC 01/31/2018 32.3  31.4 - 36.0 g/dL Final   • RDW 01/31/2018 13.5  11.5 - 14.5 % Final   • RDW-SD 01/31/2018 44.9  36.4 - 46.3 fl Final   • MPV 01/31/2018 11.4  8.0 - 12.0 fL Final   • Platelets 01/31/2018 157  150 - 450 10*3/mm3 Final   • Neutrophil % 01/31/2018 49.9  37.0 - 80.0 % Final   • Lymphocyte % 01/31/2018 31.5  10.0 - 50.0 % Final   • Monocyte % 01/31/2018 9.6  0.0 - 12.0 % Final   • Eosinophil % 01/31/2018 7.7* 0.0 - 7.0 % Final   • Basophil % 01/31/2018 1.3  0.0 - 2.0 % Final   • Neutrophils, Absolute 01/31/2018 3.38  2.00 - 8.60 10*3/mm3 Final   • Lymphocytes, Absolute 01/31/2018 2.13  0.60 - 4.20 10*3/mm3 Final   • Monocytes, Absolute 01/31/2018 0.65  0.00 - 0.90 10*3/mm3 Final   • Eosinophils, Absolute 01/31/2018 0.52  0.00 - 0.70 10*3/mm3 Final   • Basophils, Absolute 01/31/2018 0.09  0.00 - 0.20 10*3/mm3 Final   ]

## 2018-03-01 DIAGNOSIS — K21.9 GASTROESOPHAGEAL REFLUX DISEASE WITHOUT ESOPHAGITIS: ICD-10-CM

## 2018-03-01 RX ORDER — CHLORDIAZEPOXIDE HYDROCHLORIDE AND CLIDINIUM BROMIDE 5; 2.5 MG/1; MG/1
1 CAPSULE ORAL 2 TIMES DAILY
Qty: 60 CAPSULE | Refills: 2 | Status: SHIPPED | OUTPATIENT
Start: 2018-03-01 | End: 2018-07-03

## 2018-03-26 DIAGNOSIS — I10 ESSENTIAL HYPERTENSION: Chronic | ICD-10-CM

## 2018-03-26 RX ORDER — LISINOPRIL 10 MG/1
10 TABLET ORAL DAILY
Qty: 90 TABLET | Refills: 3 | Status: SHIPPED | OUTPATIENT
Start: 2018-03-26 | End: 2018-08-06

## 2018-06-05 DIAGNOSIS — E78.5 HYPERLIPIDEMIA, UNSPECIFIED HYPERLIPIDEMIA TYPE: ICD-10-CM

## 2018-06-05 RX ORDER — ROSUVASTATIN CALCIUM 5 MG/1
TABLET, COATED ORAL
Qty: 90 TABLET | Refills: 1 | Status: SHIPPED | OUTPATIENT
Start: 2018-06-05 | End: 2018-08-29 | Stop reason: SDUPTHER

## 2018-06-25 DIAGNOSIS — I10 ESSENTIAL HYPERTENSION: ICD-10-CM

## 2018-06-25 RX ORDER — METOPROLOL SUCCINATE 25 MG/1
25 TABLET, EXTENDED RELEASE ORAL DAILY
Qty: 30 TABLET | Refills: 2 | Status: SHIPPED | OUTPATIENT
Start: 2018-06-25 | End: 2018-09-19 | Stop reason: SDUPTHER

## 2018-07-02 ENCOUNTER — OFFICE VISIT (OUTPATIENT)
Dept: FAMILY MEDICINE CLINIC | Facility: CLINIC | Age: 83
End: 2018-07-02

## 2018-07-02 VITALS
HEIGHT: 61 IN | SYSTOLIC BLOOD PRESSURE: 110 MMHG | DIASTOLIC BLOOD PRESSURE: 60 MMHG | BODY MASS INDEX: 30.21 KG/M2 | HEART RATE: 78 BPM | WEIGHT: 160 LBS

## 2018-07-02 DIAGNOSIS — L98.9 SKIN LESION OF FACE: ICD-10-CM

## 2018-07-02 DIAGNOSIS — K21.9 GASTROESOPHAGEAL REFLUX DISEASE WITHOUT ESOPHAGITIS: ICD-10-CM

## 2018-07-02 DIAGNOSIS — Z00.00 INITIAL MEDICARE ANNUAL WELLNESS VISIT: Primary | ICD-10-CM

## 2018-07-02 PROCEDURE — 99213 OFFICE O/P EST LOW 20 MIN: CPT | Performed by: INTERNAL MEDICINE

## 2018-07-02 PROCEDURE — G0438 PPPS, INITIAL VISIT: HCPCS | Performed by: INTERNAL MEDICINE

## 2018-07-02 NOTE — PROGRESS NOTES
QUICK REFERENCE INFORMATION:  The ABCs of the Annual Wellness Visit    Initial Medicare Wellness Visit    HEALTH RISK ASSESSMENT    7/31/1924    Recent Hospitalizations:  No hospitalization(s) within the last year..        Current Medical Providers:  Patient Care Team:  Vaibhav Mai MD as PCP - General (Family Medicine)  Buddy Roque OD as PCP - Francis Patton (Ophthalmology)        Smoking Status:  History   Smoking Status   • Former Smoker   • Quit date: 8/22/1976   Smokeless Tobacco   • Never Used       Alcohol Consumption:  History   Alcohol Use No       Depression Screen:   PHQ-2/PHQ-9 Depression Screening 7/2/2018   Little interest or pleasure in doing things 0   Feeling down, depressed, or hopeless 0   Trouble falling or staying asleep, or sleeping too much -   Feeling tired or having little energy -   Poor appetite or overeating -   Feeling bad about yourself - or that you are a failure or have let yourself or your family down -   Trouble concentrating on things, such as reading the newspaper or watching television -   Moving or speaking so slowly that other people could have noticed. Or the opposite - being so fidgety or restless that you have been moving around a lot more than usual -   Thoughts that you would be better off dead, or of hurting yourself in some way -   Total Score 0   If you checked off any problems, how difficult have these problems made it for you to do your work, take care of things at home, or get along with other people? -       Health Habits and Functional and Cognitive Screening:  Functional & Cognitive Status 7/2/2018   Do you have difficulty preparing food and eating? No   Do you have difficulty bathing yourself, getting dressed or grooming yourself? No   Do you have difficulty using the toilet? No   Do you have difficulty moving around from place to place? No   Do you have trouble with steps or getting out of a bed or a chair? No   In the past year have  you fallen or experienced a near fall? No   Current Diet Well Balanced Diet   Dental Exam Up to date   Eye Exam Up to date   Exercise (times per week) 5 times per week   Current Exercise Activities Include Stationary Bicycling/Spin Class   Do you need help using the phone?  No   Are you deaf or do you have serious difficulty hearing?  No   Do you need help with transportation? No   Do you need help shopping? No   Do you need help preparing meals?  No   Do you need help with housework?  No   Do you need help with laundry? No   Do you need help taking your medications? No   Do you need help managing money? No   Do you ever drive or ride in a car without wearing a seat belt? No   Have you felt unusual stress, anger or loneliness in the last month? No   Who do you live with? Spouse   If you need help, do you have trouble finding someone available to you? No   Have you been bothered in the last four weeks by sexual problems? No   Do you have difficulty concentrating, remembering or making decisions? No           Does the patient have evidence of cognitive impairment? No    Asiprin use counseling: Taking ASA appropriately as indicated      Recent Lab Results:    Visual Acuity:  No exam data present    Age-appropriate Screening Schedule:  Refer to the list below for future screening recommendations based on patient's age, sex and/or medical conditions. Orders for these recommended tests are listed in the plan section. The patient has been provided with a written plan.    Health Maintenance   Topic Date Due   • TDAP/TD VACCINES (1 - Tdap) 07/31/1943   • ZOSTER VACCINE (1 of 2) 07/31/1974   • DXA SCAN  08/22/2016   • INFLUENZA VACCINE  08/01/2018   • LIPID PANEL  01/31/2019   • PNEUMOCOCCAL VACCINES (65+ LOW/MEDIUM RISK)  Excluded        Subjective   History of Present Illness    Emily Valdez is a 89 y.o. female who presents for an Annual Wellness Visit.    The following portions of the patient's history were reviewed  and updated as appropriate: allergies, current medications, past family history, past medical history, past social history, past surgical history and problem list.    Outpatient Medications Prior to Visit   Medication Sig Dispense Refill   • calcium carbonate (TUMS) 500 MG chewable tablet Chew 1 tablet Every Night.     • clidinium-chlordiazePOXIDE (LIBRAX) 5-2.5 MG per capsule Take 1 capsule by mouth 2 (Two) Times a Day. 60 capsule 2   • docusate sodium (COLACE) 100 MG capsule Take 100 mg by mouth Every Night.     • ferrous sulfate 325 (65 FE) MG EC tablet Take 1 tablet by mouth Daily With Breakfast. 30 tablet 11   • Fish Oil oil 1 capsule 3 (three) times a day.     • lisinopril (PRINIVIL,ZESTRIL) 10 MG tablet TAKE 1 TABLET BY MOUTH DAILY 90 tablet 3   • LUMIGAN 0.01 % ophthalmic drops 1 drop to each eye q hs       • magnesium oxide (MAGOX) 400 (241.3 MG) MG tablet tablet Take 1 tablet by mouth 2 (two) times a day. 60 tablet 5   • metoprolol succinate XL (TOPROL-XL) 25 MG 24 hr tablet Take 1 tablet by mouth Daily. 30 tablet 2   • Multiple Vitamins-Minerals (CENTRUM SILVER 50+WOMEN PO) Take 1 tablet by mouth Daily.     • Multiple Vitamins-Minerals (OCUVITE PO) Take 1 tablet by mouth Daily.     • Polyvinyl Alcohol-Povidone (REFRESH OP) Apply  to eye.     • promethazine (PHENERGAN) 25 MG tablet TAKE 1 TABLET BY MOUTH EVERY 6 HOURS AS NEEDED FOR NAUSEA 30 tablet 1   • rosuvastatin (CRESTOR) 5 MG tablet TAKE 1 TABLET BY MOUTH EVERY EVENING 90 tablet 1   • aspirin 81 MG EC tablet 1 tablet po three times weekly       No facility-administered medications prior to visit.        Patient Active Problem List   Diagnosis   • Primary osteoarthritis involving multiple joints   • Mixed hyperlipidemia   • Gastroesophageal reflux disease without esophagitis   • Essential hypertension   • Generalized anxiety disorder   • Diverticulitis of colon   • Iron deficiency anemia secondary to inadequate dietary iron intake   • Macular  "degeneration   • Glaucoma       Advance Care Planning:  has an advance directive - a copy HAS NOT been provided    Identification of Risk Factors:  Risk factors include: increased fall risk.    Review of Systems    Compared to one year ago, the patient feels her physical health is worse.  Compared to one year ago, the patient feels her mental health is the same.    Objective     Physical Exam    Vitals:    07/02/18 1048   BP: 110/60   BP Location: Left arm   Patient Position: Sitting   Cuff Size: Adult   Pulse: 78   Weight: 72.6 kg (160 lb)   Height: 154.9 cm (61\")   PainSc: 0-No pain       Patient's Body mass index is 30.23 kg/m². BMI is above normal parameters. Recommendations include: educational material.      Assessment/Plan   Patient Self-Management and Personalized Health Advice  The patient has been provided with information about: diet, exercise and weight management and preventive services including:   · Diabetes screening, see lab orders, Exercise counseling provided, Fall Risk assessment done, Nutrition counseling provided.    Visit Diagnoses:    ICD-10-CM ICD-9-CM   1. Initial Medicare annual wellness visit Z00.00 V70.0       No orders of the defined types were placed in this encounter.      Outpatient Encounter Prescriptions as of 7/2/2018   Medication Sig Dispense Refill   • calcium carbonate (TUMS) 500 MG chewable tablet Chew 1 tablet Every Night.     • clidinium-chlordiazePOXIDE (LIBRAX) 5-2.5 MG per capsule Take 1 capsule by mouth 2 (Two) Times a Day. 60 capsule 2   • docusate sodium (COLACE) 100 MG capsule Take 100 mg by mouth Every Night.     • ferrous sulfate 325 (65 FE) MG EC tablet Take 1 tablet by mouth Daily With Breakfast. 30 tablet 11   • Fish Oil oil 1 capsule 3 (three) times a day.     • lisinopril (PRINIVIL,ZESTRIL) 10 MG tablet TAKE 1 TABLET BY MOUTH DAILY 90 tablet 3   • LUMIGAN 0.01 % ophthalmic drops 1 drop to each eye q hs       • magnesium oxide (MAGOX) 400 (241.3 MG) MG tablet " tablet Take 1 tablet by mouth 2 (two) times a day. 60 tablet 5   • metoprolol succinate XL (TOPROL-XL) 25 MG 24 hr tablet Take 1 tablet by mouth Daily. 30 tablet 2   • Multiple Vitamins-Minerals (CENTRUM SILVER 50+WOMEN PO) Take 1 tablet by mouth Daily.     • Multiple Vitamins-Minerals (OCUVITE PO) Take 1 tablet by mouth Daily.     • Polyvinyl Alcohol-Povidone (REFRESH OP) Apply  to eye.     • promethazine (PHENERGAN) 25 MG tablet TAKE 1 TABLET BY MOUTH EVERY 6 HOURS AS NEEDED FOR NAUSEA 30 tablet 1   • rosuvastatin (CRESTOR) 5 MG tablet TAKE 1 TABLET BY MOUTH EVERY EVENING 90 tablet 1   • aspirin 81 MG EC tablet 1 tablet po three times weekly       No facility-administered encounter medications on file as of 7/2/2018.        Reviewed use of high risk medication in the elderly: yes  Reviewed for potential of harmful drug interactions in the elderly: yes    Follow Up:  Return in 5 weeks (on 8/6/2018) for Next scheduled follow up, Or sooner as needed With Labs prior to appointment.     An After Visit Summary and PPPS with all of these plans were given to the patient.

## 2018-07-02 NOTE — PROGRESS NOTES
"Chief Complaint   Patient presents with   • Annual Exam     Medicare Wellness   • Skin Lesion     behind left ear     Subjective   Emily Valdez is a 89 y.o. female who presents to the office complaining of a skin lesion which is present behind her left ear. It has been present for approximately 6 months.  It started as 2 small blisters just behind her ear.  She has tried using Neosporin and saltwater on the lesion, but this has not caused any improvement.  It is now scabbing over, but the scab falls off every few days.  She occasionally has some blood-tinged drainage.  She does not recall any injury to the area.    The following portions of the patient's history were reviewed and updated as appropriate: allergies, current medications, past family history, past medical history, past social history, past surgical history and problem list.    Review of Systems   Constitutional: Negative for chills, fatigue and fever.   HENT: Negative for congestion, sneezing, sore throat and trouble swallowing.    Eyes: Negative for visual disturbance.   Respiratory: Negative for cough, chest tightness, shortness of breath and wheezing.    Cardiovascular: Negative for chest pain, palpitations and leg swelling.   Gastrointestinal: Negative for constipation, diarrhea, nausea and vomiting.   Genitourinary: Negative for dysuria, frequency and urgency.   Musculoskeletal: Negative for neck pain.   Skin: Negative for rash.   Neurological: Negative for dizziness, weakness and headaches.   Psychiatric/Behavioral:        Patient denies any feelings of depression and has not felt down, hopeless or lost interest in any activities.       Objective   Vitals:    07/02/18 1048   BP: 110/60   BP Location: Left arm   Patient Position: Sitting   Cuff Size: Adult   Pulse: 78   Weight: 72.6 kg (160 lb)   Height: 154.9 cm (61\")   PainSc: 0-No pain     Physical Exam   Constitutional: She is oriented to person, place, and time. She appears " well-developed and well-nourished.   HENT:   Head: Normocephalic and atraumatic.   Nose: Nose normal.   Mouth/Throat: Oropharynx is clear and moist. No oropharyngeal exudate.   Eyes: Conjunctivae and EOM are normal. Pupils are equal, round, and reactive to light. Right eye exhibits no discharge. Left eye exhibits no discharge. No scleral icterus.   Neck: Normal range of motion. Neck supple. No thyromegaly present.   Cardiovascular: Normal rate, regular rhythm, normal heart sounds and intact distal pulses.  Exam reveals no gallop and no friction rub.    No murmur heard.  Pulmonary/Chest: Effort normal and breath sounds normal. No respiratory distress. She has no wheezes. She has no rales.   Abdominal: Soft. Bowel sounds are normal. She exhibits no distension. There is no tenderness. There is no rebound and no guarding.   Musculoskeletal: She exhibits no edema.   Lymphadenopathy:     She has no cervical adenopathy.   Neurological: She is alert and oriented to person, place, and time. No cranial nerve deficit. Gait abnormal.   Patient walks with the aid of a walker   Skin: Skin is warm and dry. No rash noted.   She has a circular appearing lesion behind the left ear.  It has an irregular border.  There is no drainage noted.   Psychiatric: She has a normal mood and affect. Her behavior is normal. Judgment and thought content normal.   Nursing note and vitals reviewed.      Assessment/Plan   Emily was seen today for annual exam and skin lesion.    Diagnoses and all orders for this visit:    Initial Medicare annual wellness visit    Skin lesion of face  -     mupirocin (BACTROBAN) 2 % ointment; Apply  topically 2 (Two) Times a Day.    She is given Bactroban ointment to use on the skin lesion.  There is a chance that this could be transitioning into a skin cancer type lesion.  If it has not improved after using the Bactroban for a couple of weeks, she will let me know.  She will then need referral to dermatology for  evaluation and removal of the lesion.         PHQ-2/PHQ-9 Depression Screening 7/2/2018   Little interest or pleasure in doing things 0   Feeling down, depressed, or hopeless 0   Trouble falling or staying asleep, or sleeping too much -   Feeling tired or having little energy -   Poor appetite or overeating -   Feeling bad about yourself - or that you are a failure or have let yourself or your family down -   Trouble concentrating on things, such as reading the newspaper or watching television -   Moving or speaking so slowly that other people could have noticed. Or the opposite - being so fidgety or restless that you have been moving around a lot more than usual -   Thoughts that you would be better off dead, or of hurting yourself in some way -   Total Score 0   If you checked off any problems, how difficult have these problems made it for you to do your work, take care of things at home, or get along with other people? -

## 2018-07-03 RX ORDER — CHLORDIAZEPOXIDE HYDROCHLORIDE AND CLIDINIUM BROMIDE 5; 2.5 MG/1; MG/1
CAPSULE ORAL
Qty: 60 CAPSULE | Refills: 2 | Status: SHIPPED | OUTPATIENT
Start: 2018-07-03 | End: 2018-11-30 | Stop reason: SDUPTHER

## 2018-07-19 DIAGNOSIS — D50.8 IRON DEFICIENCY ANEMIA SECONDARY TO INADEQUATE DIETARY IRON INTAKE: Chronic | ICD-10-CM

## 2018-07-19 RX ORDER — FERROUS SULFATE 325(65) MG
325 TABLET ORAL
Qty: 30 TABLET | Refills: 11 | OUTPATIENT
Start: 2018-07-19

## 2018-07-31 ENCOUNTER — LAB (OUTPATIENT)
Dept: LAB | Facility: OTHER | Age: 83
End: 2018-07-31

## 2018-07-31 DIAGNOSIS — D50.8 IRON DEFICIENCY ANEMIA SECONDARY TO INADEQUATE DIETARY IRON INTAKE: ICD-10-CM

## 2018-07-31 DIAGNOSIS — E78.2 MIXED HYPERLIPIDEMIA: Chronic | ICD-10-CM

## 2018-07-31 DIAGNOSIS — I10 ESSENTIAL HYPERTENSION: ICD-10-CM

## 2018-07-31 LAB
ALBUMIN SERPL-MCNC: 3.9 G/DL (ref 3.5–5)
ALBUMIN/GLOB SERPL: 1.3 G/DL (ref 1.1–1.8)
ALP SERPL-CCNC: 67 U/L (ref 38–126)
ALT SERPL W P-5'-P-CCNC: 12 U/L
ANION GAP SERPL CALCULATED.3IONS-SCNC: 7 MMOL/L (ref 5–15)
ARTICHOKE IGE QN: 35 MG/DL (ref 1–129)
AST SERPL-CCNC: 31 U/L (ref 14–36)
BACTERIA UR QL AUTO: ABNORMAL /HPF
BASOPHILS # BLD AUTO: 0.04 10*3/MM3 (ref 0–0.2)
BASOPHILS NFR BLD AUTO: 0.7 % (ref 0–2)
BILIRUB SERPL-MCNC: 0.4 MG/DL (ref 0.2–1.3)
BILIRUB UR QL STRIP: NEGATIVE
BUN BLD-MCNC: 23 MG/DL (ref 7–17)
BUN/CREAT SERPL: 19.2 (ref 7–25)
CALCIUM SPEC-SCNC: 9.1 MG/DL (ref 8.4–10.2)
CHLORIDE SERPL-SCNC: 108 MMOL/L (ref 98–107)
CLARITY UR: ABNORMAL
CO2 SERPL-SCNC: 28 MMOL/L (ref 22–30)
COLOR UR: YELLOW
CREAT BLD-MCNC: 1.2 MG/DL (ref 0.52–1.04)
DEPRECATED RDW RBC AUTO: 48.6 FL (ref 36.4–46.3)
EOSINOPHIL # BLD AUTO: 0.27 10*3/MM3 (ref 0–0.7)
EOSINOPHIL NFR BLD AUTO: 4.7 % (ref 0–7)
ERYTHROCYTE [DISTWIDTH] IN BLOOD BY AUTOMATED COUNT: 14.2 % (ref 11.5–14.5)
GFR SERPL CREATININE-BSD FRML MDRD: 42 ML/MIN/1.73 (ref 39–90)
GLOBULIN UR ELPH-MCNC: 3.1 GM/DL (ref 2.3–3.5)
GLUCOSE BLD-MCNC: 92 MG/DL (ref 74–99)
GLUCOSE UR STRIP-MCNC: NEGATIVE MG/DL
HCT VFR BLD AUTO: 33.3 % (ref 35–45)
HGB BLD-MCNC: 10.5 G/DL (ref 12–15.5)
HGB UR QL STRIP.AUTO: NEGATIVE
HYALINE CASTS UR QL AUTO: ABNORMAL /LPF
KETONES UR QL STRIP: NEGATIVE
LEUKOCYTE ESTERASE UR QL STRIP.AUTO: ABNORMAL
LYMPHOCYTES # BLD AUTO: 2.03 10*3/MM3 (ref 0.6–4.2)
LYMPHOCYTES NFR BLD AUTO: 35.1 % (ref 10–50)
MCH RBC QN AUTO: 30.9 PG (ref 26.5–34)
MCHC RBC AUTO-ENTMCNC: 31.5 G/DL (ref 31.4–36)
MCV RBC AUTO: 97.9 FL (ref 80–98)
MONOCYTES # BLD AUTO: 0.65 10*3/MM3 (ref 0–0.9)
MONOCYTES NFR BLD AUTO: 11.2 % (ref 0–12)
MUCOUS THREADS URNS QL MICRO: ABNORMAL /HPF
NEUTROPHILS # BLD AUTO: 2.79 10*3/MM3 (ref 2–8.6)
NEUTROPHILS NFR BLD AUTO: 48.3 % (ref 37–80)
NITRITE UR QL STRIP: NEGATIVE
PH UR STRIP.AUTO: 8.5 [PH] (ref 5.5–8)
PLATELET # BLD AUTO: 124 10*3/MM3 (ref 150–450)
PMV BLD AUTO: 10.6 FL (ref 8–12)
POTASSIUM BLD-SCNC: 5.6 MMOL/L (ref 3.4–5)
PROT SERPL-MCNC: 7 G/DL (ref 6.3–8.2)
PROT UR QL STRIP: NEGATIVE
RBC # BLD AUTO: 3.4 10*6/MM3 (ref 3.77–5.16)
RBC # UR: ABNORMAL /HPF
REF LAB TEST METHOD: ABNORMAL
SODIUM BLD-SCNC: 143 MMOL/L (ref 137–145)
SP GR UR STRIP: 1.02 (ref 1–1.03)
SQUAMOUS #/AREA URNS HPF: ABNORMAL /HPF
T4 FREE SERPL-MCNC: 1.14 NG/DL (ref 0.78–2.19)
TSH SERPL DL<=0.05 MIU/L-ACNC: 2.75 MIU/ML (ref 0.46–4.68)
UROBILINOGEN UR QL STRIP: ABNORMAL
WBC CLUMPS # UR AUTO: ABNORMAL /HPF
WBC NRBC COR # BLD: 5.78 10*3/MM3 (ref 3.2–9.8)
WBC UR QL AUTO: ABNORMAL /HPF

## 2018-07-31 PROCEDURE — 84443 ASSAY THYROID STIM HORMONE: CPT | Performed by: INTERNAL MEDICINE

## 2018-07-31 PROCEDURE — 36415 COLL VENOUS BLD VENIPUNCTURE: CPT | Performed by: INTERNAL MEDICINE

## 2018-07-31 PROCEDURE — 84439 ASSAY OF FREE THYROXINE: CPT | Performed by: INTERNAL MEDICINE

## 2018-07-31 PROCEDURE — 81001 URINALYSIS AUTO W/SCOPE: CPT | Performed by: INTERNAL MEDICINE

## 2018-07-31 PROCEDURE — 83721 ASSAY OF BLOOD LIPOPROTEIN: CPT | Performed by: INTERNAL MEDICINE

## 2018-07-31 PROCEDURE — 85025 COMPLETE CBC W/AUTO DIFF WBC: CPT | Performed by: INTERNAL MEDICINE

## 2018-07-31 PROCEDURE — 87086 URINE CULTURE/COLONY COUNT: CPT | Performed by: INTERNAL MEDICINE

## 2018-07-31 PROCEDURE — 80053 COMPREHEN METABOLIC PANEL: CPT | Performed by: INTERNAL MEDICINE

## 2018-08-02 LAB — BACTERIA SPEC AEROBE CULT: NORMAL

## 2018-08-06 ENCOUNTER — OFFICE VISIT (OUTPATIENT)
Dept: FAMILY MEDICINE CLINIC | Facility: CLINIC | Age: 83
End: 2018-08-06

## 2018-08-06 VITALS
DIASTOLIC BLOOD PRESSURE: 72 MMHG | HEIGHT: 61 IN | HEART RATE: 70 BPM | SYSTOLIC BLOOD PRESSURE: 116 MMHG | WEIGHT: 158 LBS | BODY MASS INDEX: 29.83 KG/M2

## 2018-08-06 DIAGNOSIS — F41.1 GENERALIZED ANXIETY DISORDER: Chronic | ICD-10-CM

## 2018-08-06 DIAGNOSIS — K21.9 GASTROESOPHAGEAL REFLUX DISEASE WITHOUT ESOPHAGITIS: Chronic | ICD-10-CM

## 2018-08-06 DIAGNOSIS — D50.8 IRON DEFICIENCY ANEMIA SECONDARY TO INADEQUATE DIETARY IRON INTAKE: Chronic | ICD-10-CM

## 2018-08-06 DIAGNOSIS — I10 ESSENTIAL HYPERTENSION: Primary | Chronic | ICD-10-CM

## 2018-08-06 DIAGNOSIS — E78.2 MIXED HYPERLIPIDEMIA: Chronic | ICD-10-CM

## 2018-08-06 DIAGNOSIS — M15.9 PRIMARY OSTEOARTHRITIS INVOLVING MULTIPLE JOINTS: Chronic | ICD-10-CM

## 2018-08-06 DIAGNOSIS — L98.9 SKIN LESION OF FACE: ICD-10-CM

## 2018-08-06 PROCEDURE — 99214 OFFICE O/P EST MOD 30 MIN: CPT | Performed by: INTERNAL MEDICINE

## 2018-08-06 RX ORDER — LISINOPRIL 10 MG/1
5 TABLET ORAL DAILY
Qty: 90 TABLET | Refills: 3
Start: 2018-08-06 | End: 2019-08-09

## 2018-08-06 NOTE — PROGRESS NOTES
Chief Complaint   Patient presents with   • Hyperlipidemia   • Hypertension     Subjective   Emily Valdez is a 89 y.o. female who presents to the office for follow-up and review of labs. She has hypertension and her blood pressure has been controlled. Some nights she is feeling dizzy when she lays down to go to sleep.  She has hyperlipidemia and takes Crestor daily. She has a history of GERD, and uses Tums as needed. She has macular degeneration and sees ophthalmology on a regular basis.  She continues to have worsening of her eyesight, and this often causes a lot of stress and anxiety.  She has iron deficiency anemia, and takes a daily multivitamin with iron.  She has osteoarthritis and takes over-the-counter NSAIDs as needed.     I saw her last month for a skin lesion behind the left ear. This did not improve, and she is needing referral to see a dermatologist.    The following portions of the patient's history were reviewed and updated as appropriate: allergies, current medications, past family history, past medical history, past social history, past surgical history and problem list.    Review of Systems   Constitutional: Negative for chills, fatigue and fever.   HENT: Negative for congestion, sneezing, sore throat and trouble swallowing.    Eyes: Negative for visual disturbance.   Respiratory: Negative for cough, chest tightness, shortness of breath and wheezing.    Cardiovascular: Negative for chest pain, palpitations and leg swelling.   Gastrointestinal: Negative for constipation, diarrhea, nausea and vomiting.   Genitourinary: Negative for dysuria, frequency and urgency.   Musculoskeletal: Negative for neck pain.   Skin: Negative for rash.   Neurological: Positive for dizziness. Negative for weakness and headaches.   Psychiatric/Behavioral:        Patient denies any feelings of depression and has not felt down, hopeless or lost interest in any activities.       Objective   Vitals:    08/06/18 1255  "  BP: 116/72   BP Location: Left arm   Patient Position: Sitting   Cuff Size: Adult   Pulse: 70   Weight: 71.7 kg (158 lb)   Height: 154.9 cm (61\")   PainSc: 0-No pain     Physical Exam   Constitutional: She is oriented to person, place, and time. She appears well-developed and well-nourished.   HENT:   Head: Normocephalic and atraumatic.   Nose: Nose normal.   Mouth/Throat: Oropharynx is clear and moist. No oropharyngeal exudate.   Eyes: Pupils are equal, round, and reactive to light. Conjunctivae and EOM are normal. Right eye exhibits no discharge. Left eye exhibits no discharge. No scleral icterus.   She is wearing dark sunglasses secondary to her macular degeneration and glaucoma   Neck: Normal range of motion. Neck supple. No thyromegaly present.   Cardiovascular: Normal rate, regular rhythm, normal heart sounds and intact distal pulses.  Exam reveals no gallop and no friction rub.    No murmur heard.  Pulmonary/Chest: Effort normal and breath sounds normal. No respiratory distress. She has no wheezes. She has no rales.   Abdominal: Soft. Bowel sounds are normal. She exhibits no distension. There is no tenderness. There is no rebound and no guarding.   Musculoskeletal: She exhibits no edema.   Lymphadenopathy:     She has no cervical adenopathy.   Neurological: She is alert and oriented to person, place, and time. No cranial nerve deficit. Gait abnormal.   Patient walks with the aid of a walker   Skin: Skin is warm and dry. No rash noted.   She has a circular appearing lesion behind the left ear.  It has an irregular border.  There is no drainage noted.   Psychiatric: She has a normal mood and affect. Her behavior is normal. Judgment and thought content normal.   Nursing note and vitals reviewed.      Assessment/Plan   Emily was seen today for hyperlipidemia and hypertension.    Diagnoses and all orders for this visit:    Essential hypertension  -     Comprehensive Metabolic Panel; Future  -     T4, Free; " Future  -     TSH; Future  -     Urinalysis With Culture If Indicated - Urine, Clean Catch; Future  -     lisinopril (PRINIVIL,ZESTRIL) 10 MG tablet; Take 0.5 tablets by mouth Daily.    Mixed hyperlipidemia  -     Lipid Panel; Future    Gastroesophageal reflux disease without esophagitis    Primary osteoarthritis involving multiple joints    Iron deficiency anemia secondary to inadequate dietary iron intake  -     CBC & Differential; Future    Generalized anxiety disorder    Skin lesion of face  -     Ambulatory Referral to Dermatology         Labs are reviewed with patient.  Her anemia is stable. Her hemoglobin is decreased but baseline at 10.5. Her LDL is at goal, and she will continue with Crestor.  Her blood pressure is controlled.  Her nighttime dizziness could be related to the blood pressure running a little low at times.  I will reduce lisinopril to 5 mg daily.  She will continue with the current dose of metoprolol.  She will continue with Phenergan and Librax as needed for her GI symptoms.   She will continue with Tylenol or other OTC NSAID's as needed for her osteoarthritis.  I will refer her to dermatology for evaluation and removal of the skin lesion on her face/ear.    PHQ-2/PHQ-9 Depression Screening 8/6/2018   Little interest or pleasure in doing things 0   Feeling down, depressed, or hopeless 0   Trouble falling or staying asleep, or sleeping too much -   Feeling tired or having little energy -   Poor appetite or overeating -   Feeling bad about yourself - or that you are a failure or have let yourself or your family down -   Trouble concentrating on things, such as reading the newspaper or watching television -   Moving or speaking so slowly that other people could have noticed. Or the opposite - being so fidgety or restless that you have been moving around a lot more than usual -   Thoughts that you would be better off dead, or of hurting yourself in some way -   Total Score 0   If you checked off  any problems, how difficult have these problems made it for you to do your work, take care of things at home, or get along with other people? -         Lab on 07/31/2018   Component Date Value Ref Range Status   • Glucose 07/31/2018 92  74 - 99 mg/dL Final   • BUN 07/31/2018 23* 7 - 17 mg/dL Final   • Creatinine 07/31/2018 1.20* 0.52 - 1.04 mg/dL Final   • Sodium 07/31/2018 143  137 - 145 mmol/L Final   • Potassium 07/31/2018 5.6* 3.4 - 5.0 mmol/L Final   • Chloride 07/31/2018 108* 98 - 107 mmol/L Final   • CO2 07/31/2018 28.0  22.0 - 30.0 mmol/L Final   • Calcium 07/31/2018 9.1  8.4 - 10.2 mg/dL Final   • Total Protein 07/31/2018 7.0  6.3 - 8.2 g/dL Final   • Albumin 07/31/2018 3.90  3.50 - 5.00 g/dL Final   • ALT (SGPT) 07/31/2018 12  <=35 U/L Final   • AST (SGOT) 07/31/2018 31  14 - 36 U/L Final   • Alkaline Phosphatase 07/31/2018 67  38 - 126 U/L Final   • Total Bilirubin 07/31/2018 0.4  0.2 - 1.3 mg/dL Final   • eGFR Non African Amer 07/31/2018 42  39 - 90 mL/min/1.73 Final   • Globulin 07/31/2018 3.1  2.3 - 3.5 gm/dL Final   • A/G Ratio 07/31/2018 1.3  1.1 - 1.8 g/dL Final   • BUN/Creatinine Ratio 07/31/2018 19.2  7.0 - 25.0 Final   • Anion Gap 07/31/2018 7.0  5.0 - 15.0 mmol/L Final   • LDL Cholesterol  07/31/2018 35  1 - 129 mg/dL Final   • Free T4 07/31/2018 1.14  0.78 - 2.19 ng/dL Final   • TSH 07/31/2018 2.750  0.460 - 4.680 mIU/mL Final   • Color, UA 07/31/2018 Yellow  Yellow, Straw Final   • Appearance, UA 07/31/2018 Cloudy* Clear Final   • pH, UA 07/31/2018 8.5* 5.5 - 8.0 Final   • Specific Gravity, UA 07/31/2018 1.020  1.005 - 1.030 Final   • Glucose, UA 07/31/2018 Negative  Negative Final   • Ketones, UA 07/31/2018 Negative  Negative Final   • Bilirubin, UA 07/31/2018 Negative  Negative Final   • Blood, UA 07/31/2018 Negative  Negative Final   • Protein, UA 07/31/2018 Negative  Negative Final   • Leuk Esterase, UA 07/31/2018 Small (1+)* Negative Final   • Nitrite, UA 07/31/2018 Negative  Negative  Final   • Urobilinogen, UA 07/31/2018 0.2 E.U./dL  0.2 - 1.0 E.U./dL Final   • WBC 07/31/2018 5.78  3.20 - 9.80 10*3/mm3 Final   • RBC 07/31/2018 3.40* 3.77 - 5.16 10*6/mm3 Final   • Hemoglobin 07/31/2018 10.5* 12.0 - 15.5 g/dL Final   • Hematocrit 07/31/2018 33.3* 35.0 - 45.0 % Final   • MCV 07/31/2018 97.9  80.0 - 98.0 fL Final   • MCH 07/31/2018 30.9  26.5 - 34.0 pg Final   • MCHC 07/31/2018 31.5  31.4 - 36.0 g/dL Final   • RDW 07/31/2018 14.2  11.5 - 14.5 % Final   • RDW-SD 07/31/2018 48.6* 36.4 - 46.3 fl Final   • MPV 07/31/2018 10.6  8.0 - 12.0 fL Final   • Platelets 07/31/2018 124* 150 - 450 10*3/mm3 Final   • Neutrophil % 07/31/2018 48.3  37.0 - 80.0 % Final   • Lymphocyte % 07/31/2018 35.1  10.0 - 50.0 % Final   • Monocyte % 07/31/2018 11.2  0.0 - 12.0 % Final   • Eosinophil % 07/31/2018 4.7  0.0 - 7.0 % Final   • Basophil % 07/31/2018 0.7  0.0 - 2.0 % Final   • Neutrophils, Absolute 07/31/2018 2.79  2.00 - 8.60 10*3/mm3 Final   • Lymphocytes, Absolute 07/31/2018 2.03  0.60 - 4.20 10*3/mm3 Final   • Monocytes, Absolute 07/31/2018 0.65  0.00 - 0.90 10*3/mm3 Final   • Eosinophils, Absolute 07/31/2018 0.27  0.00 - 0.70 10*3/mm3 Final   • Basophils, Absolute 07/31/2018 0.04  0.00 - 0.20 10*3/mm3 Final   • RBC, UA 07/31/2018 0-2* None Seen /HPF Final   • WBC, UA 07/31/2018 13-20* None Seen /HPF Final   • Bacteria, UA 07/31/2018 1+* None Seen /HPF Final   • Squamous Epithelial Cells, UA 07/31/2018 0-2  None Seen, 0-2 /HPF Final   • Hyaline Casts, UA 07/31/2018 None Seen  None Seen /LPF Final   • Mucus, UA 07/31/2018 Trace  None Seen, Trace /HPF Final   • WBC Clumps, UA 07/31/2018 Small/1+  None Seen /HPF Final   • Methodology 07/31/2018 Manual Light Microscopy   Final   • Urine Culture 07/31/2018 Mixed Vidya Isolated   Final   ]

## 2018-08-21 DIAGNOSIS — D50.8 IRON DEFICIENCY ANEMIA SECONDARY TO INADEQUATE DIETARY IRON INTAKE: Chronic | ICD-10-CM

## 2018-08-21 RX ORDER — FERROUS SULFATE 325(65) MG
325 TABLET ORAL
Qty: 30 TABLET | Refills: 5 | Status: SHIPPED | OUTPATIENT
Start: 2018-08-21 | End: 2019-02-08 | Stop reason: SINTOL

## 2018-08-29 DIAGNOSIS — E78.5 HYPERLIPIDEMIA, UNSPECIFIED HYPERLIPIDEMIA TYPE: ICD-10-CM

## 2018-08-30 RX ORDER — ROSUVASTATIN CALCIUM 5 MG/1
TABLET, COATED ORAL
Qty: 90 TABLET | Refills: 1 | Status: SHIPPED | OUTPATIENT
Start: 2018-08-30 | End: 2019-02-27 | Stop reason: SDUPTHER

## 2018-09-19 DIAGNOSIS — I10 ESSENTIAL HYPERTENSION: ICD-10-CM

## 2018-09-19 RX ORDER — METOPROLOL SUCCINATE 25 MG/1
25 TABLET, EXTENDED RELEASE ORAL DAILY
Qty: 30 TABLET | Refills: 5 | Status: SHIPPED | OUTPATIENT
Start: 2018-09-19 | End: 2019-08-09 | Stop reason: SDUPTHER

## 2018-10-08 ENCOUNTER — OFFICE VISIT (OUTPATIENT)
Dept: OTOLARYNGOLOGY | Facility: CLINIC | Age: 83
End: 2018-10-08

## 2018-10-08 VITALS — BODY MASS INDEX: 31.53 KG/M2 | WEIGHT: 167 LBS | HEIGHT: 61 IN | RESPIRATION RATE: 18 BRPM

## 2018-10-08 DIAGNOSIS — H61.23 BILATERAL IMPACTED CERUMEN: ICD-10-CM

## 2018-10-08 DIAGNOSIS — C44.41 BASAL CELL CARCINOMA (BCC) OF SKIN OF NECK: Primary | ICD-10-CM

## 2018-10-08 PROCEDURE — 69210 REMOVE IMPACTED EAR WAX UNI: CPT | Performed by: OTOLARYNGOLOGY

## 2018-10-08 PROCEDURE — 99203 OFFICE O/P NEW LOW 30 MIN: CPT | Performed by: OTOLARYNGOLOGY

## 2018-10-09 NOTE — PROGRESS NOTES
Subjective   Emily Valdez is a 89 y.o. female.   This is a consultation from Dr. Jimmy Townsend  History of Present Illness   Patient has had a lesion beneath her left ear for approximately 1 year.  Is crusting.  She says she sleeps on that side and it will bleed.  Nothing in particular brought it on.  She is not had any previous cutaneous carcinomas.  It was not particularly painful.  She is not anticoagulated.  Was seen by Dr. Townsend on 8/24/18 and a biopsy was consistent with basal cell carcinoma.  Patient reports the lesion is still crusting but not painful.  She is here today to discuss definitive treatment.  Incidentally mentions that she wears hearing aids, has a history of previous tube insertion performed elsewhere, and that her ears feel stopped up.      The following portions of the patient's history were reviewed and updated as appropriate: allergies, current medications, past family history, past medical history, past social history, past surgical history and problem list.      Emily Valdez reports that she quit smoking about 42 years ago. She has never used smokeless tobacco. She reports that she does not drink alcohol or use drugs.  Patient is not a tobacco user and has not been counseled for use of tobacco products    Family History   Problem Relation Age of Onset   • Heart disease Mother    • Tuberculosis Mother    • Heart disease Father    • Cancer Brother         lung   • Alzheimer's disease Sister        Allergies   Allergen Reactions   • Codeine Other (See Comments)     syncope   • Levaquin [Levofloxacin] Other (See Comments)     unknown   • Sulfa Antibiotics Other (See Comments)     unknown         Current Outpatient Prescriptions:   •  calcium carbonate (TUMS) 500 MG chewable tablet, Chew 1 tablet Every Night., Disp: , Rfl:   •  clidinium-chlordiazePOXIDE (LIBRAX) 5-2.5 MG per capsule, TAKE 1 CAPSULE BY MOUTH TWO TIMES A DAY, Disp: 60 capsule, Rfl: 2  •  ferrous sulfate 325 (65 FE)  MG tablet, Take 1 tablet by mouth Daily With Breakfast., Disp: 30 tablet, Rfl: 5  •  Fish Oil oil, 1 capsule 3 (three) times a day., Disp: , Rfl:   •  lisinopril (PRINIVIL,ZESTRIL) 10 MG tablet, Take 0.5 tablets by mouth Daily., Disp: 90 tablet, Rfl: 3  •  LUMIGAN 0.01 % ophthalmic drops, 1 drop to each eye q hs , Disp: , Rfl:   •  magnesium oxide (MAGOX) 400 (241.3 MG) MG tablet tablet, Take 1 tablet by mouth 2 (two) times a day., Disp: 60 tablet, Rfl: 5  •  metoprolol succinate XL (TOPROL-XL) 25 MG 24 hr tablet, TAKE 1 TABLET BY MOUTH DAILY, Disp: 30 tablet, Rfl: 5  •  Multiple Vitamins-Minerals (CENTRUM SILVER 50+WOMEN PO), Take 1 tablet by mouth Daily., Disp: , Rfl:   •  Multiple Vitamins-Minerals (OCUVITE PO), Take 1 tablet by mouth Daily., Disp: , Rfl:   •  Polyvinyl Alcohol-Povidone (REFRESH OP), Apply  to eye., Disp: , Rfl:   •  promethazine (PHENERGAN) 25 MG tablet, TAKE 1 TABLET BY MOUTH EVERY 6 HOURS AS NEEDED FOR NAUSEA, Disp: 30 tablet, Rfl: 1  •  rosuvastatin (CRESTOR) 5 MG tablet, TAKE 1 TABLET BY MOUTH EVERY EVENING, Disp: 90 tablet, Rfl: 1  •  aspirin 81 MG EC tablet, 1 tablet po three times weekly, Disp: , Rfl:   •  docusate sodium (COLACE) 100 MG capsule, Take 100 mg by mouth Every Night., Disp: , Rfl:   •  mupirocin (BACTROBAN) 2 % ointment, Apply  topically 2 (Two) Times a Day., Disp: 15 g, Rfl: 1    Past Medical History:   Diagnosis Date   • Acute otitis externa    • Acute UTI    • Anxiety    • Cataract    • Constipation, unspecified    • Diverticulitis of colon    • Dysfunction of eustachian tube    • Essential hypertension    • GERD (gastroesophageal reflux disease)    • Glaucoma    • Hyperlipidemia    • Hypertension    • Mitral valve disorder    • Osteoarthritis    • Peripheral edema    • Restless leg syndrome    • Visual impairment     right eye   • Weakness          Review of Systems   HENT: Positive for hearing loss.    Eyes: Positive for visual disturbance.   Respiratory: Positive for  choking.    Gastrointestinal: Positive for abdominal pain and nausea.        Heartburn   Endocrine: Positive for cold intolerance.   Musculoskeletal: Positive for arthralgias.        Leg pain   Neurological: Positive for weakness.   All other systems reviewed and are negative.          Objective   Physical Exam  General: Well-developed well-nourished female in no acute distress.  Alert and oriented ×3. Head: Normocephalic. Face: Symmetrical strength and appearance. PERRL. EOMI. Voice:Strong. Speech:Fluent  Ears: External ears no deformity, canals no discharge, both tympanic membranes are completely occluded with cerumen and a white plastic tube is visible within the cerumen on the right  Using the binocular microscope for visualization, cerumen impaction was removed from bilateral ear canal(s) using instrumentation. This was personally performed by Jimmy Walters MD   Following cerumen removal tympanic membranes are intact with tympanosclerosis but no evidence of infection or effusion  Nose: Nares show no discharge mass polyp or purulence.  Boggy mucosa is present.  No gross external deformity.  Septum: Midline  Oral cavity: Lips and gums without lesions.  Tongue and floor of mouth without lesions.  Parotid and submandibular ducts unobstructed.  No mucosal lesions on the buccal mucosa or vestibule of the mouth.  Pharynx: No erythema exudate mass or ulcer  Neck: No lymphadenopathy.  No thyromegaly.  Trachea and larynx midline.  No masses in the parotid or submandibular glands.  SKin: 1.7 x 0.9 cm erythematous crusted lesion of the left upper neck just beneath the lobule.  Dr. Townsend's notes are available and her personally reviewed and this is consistent with the area that was biopsied and found to be basal cell carcinoma      Assessment/Plan   Emily was seen today for skin lesion.    Diagnoses and all orders for this visit:    Basal cell carcinoma (BCC) of skin of neck    Bilateral impacted cerumen    Plan:  Cerumen removed as described above.  Reassured the patient she had no evidence of recurrent middle ear effusion and therefore I would not recommend repeat tube insertion.  For the skin lesion,I have offered to perform excision of the skin lesion with frozen section margin control, and possible flap or skin graft if needed for closure.  I have explained the nature of this procedure to the patient in layman's terms including risks of bleeding, infection, poor healing, poor appearance, numbness, recurrence, and possible need for further treatment depending on final pathology.  Proposed benefit would be definitive treatment of the identified lesions.  The alternative would be observation which could result in continued or recurrent growth of the lesions.  Patient voices understanding of all of the above and wishes to proceed.  This will be scheduled.    My thanks to Dr. Townsend for this consultation

## 2018-11-01 DIAGNOSIS — K21.9 GASTROESOPHAGEAL REFLUX DISEASE WITHOUT ESOPHAGITIS: ICD-10-CM

## 2018-11-01 RX ORDER — CHLORDIAZEPOXIDE HYDROCHLORIDE AND CLIDINIUM BROMIDE 5; 2.5 MG/1; MG/1
CAPSULE ORAL
Qty: 60 CAPSULE | Refills: 2 | OUTPATIENT
Start: 2018-11-01

## 2018-11-07 ENCOUNTER — PROCEDURE VISIT (OUTPATIENT)
Dept: OTOLARYNGOLOGY | Facility: CLINIC | Age: 83
End: 2018-11-07

## 2018-11-07 VITALS — BODY MASS INDEX: 31.53 KG/M2 | HEIGHT: 61 IN | WEIGHT: 167 LBS | RESPIRATION RATE: 18 BRPM

## 2018-11-07 DIAGNOSIS — C44.41 BASAL CELL CARCINOMA (BCC) OF LEFT SIDE OF NECK: Primary | ICD-10-CM

## 2018-11-07 PROCEDURE — 88331 PATH CONSLTJ SURG 1 BLK 1SPC: CPT | Performed by: PATHOLOGY

## 2018-11-07 PROCEDURE — 11622 EXC S/N/H/F/G MAL+MRG 1.1-2: CPT | Performed by: OTOLARYNGOLOGY

## 2018-11-07 PROCEDURE — 88332 PATH CONSLTJ SURG EA ADD BLK: CPT | Performed by: PATHOLOGY

## 2018-11-07 PROCEDURE — 88332 PATH CONSLTJ SURG EA ADD BLK: CPT | Performed by: OTOLARYNGOLOGY

## 2018-11-07 PROCEDURE — 12042 INTMD RPR N-HF/GENIT2.6-7.5: CPT | Performed by: OTOLARYNGOLOGY

## 2018-11-07 PROCEDURE — 88305 TISSUE EXAM BY PATHOLOGIST: CPT | Performed by: OTOLARYNGOLOGY

## 2018-11-07 PROCEDURE — 88305 TISSUE EXAM BY PATHOLOGIST: CPT | Performed by: PATHOLOGY

## 2018-11-07 PROCEDURE — 88331 PATH CONSLTJ SURG 1 BLK 1SPC: CPT | Performed by: OTOLARYNGOLOGY

## 2018-11-09 LAB
LAB AP CASE REPORT: NORMAL
Lab: NORMAL
PATH REPORT.FINAL DX SPEC: NORMAL
PATH REPORT.GROSS SPEC: NORMAL

## 2018-11-09 NOTE — PROGRESS NOTES
Procedure note    Preop diagnosis: Previously biopsied basal cell carcinoma of the left neck    Postop diagnosis: Same    Procedure: Excision of previously biopsied basal cell carcinoma of the left neck 2.0 cm with 6.0 cm layered closure    Surgeon: Dr. Walters    Anesthesia: 1% Xylocaine with epinephrine    Description of procedure: After again explaining the nature of the procedure to the patient in layman's terms including risks of bleeding, infection, poor healing, poor appearance, numbness, recurrence, and possible need for further surgery depending on final pathology versus the alternative of observation informed consent was confirmed.  The previous biopsy site was identified in the left neck and was noted to be grossly abnormal with crusting and erythema.  An elliptical incision was designed to follow relaxed skin tension lines in the area.  This was cleansed with alcohol and infiltrated 1% Xylocaine with epinephrine and prepped and draped sterilely.  Lesion was excised sharply, full-thickness, marked with a suture for orientation and sent to pathology for frozen section.  Wound was irrigated copiously with saline and the subcutaneous tissues were closed with interrupted 4-0 Vicryl and a saline dressing was placed over the wound follow awaiting frozen section.  Frozen section returned basal cell carcinoma with a positive anterior margin superiorly and a positive deep margin superiorly.  Therefore the Vicryl sutures were trimmed and removed and the wound was again irrigated with saline a new incision was made in the anterior margin excising an additional strip of tissue in the area identified as positive margin this included the inferior portion of the left lobule.  Additional deep tissue was also excised in the same area.  This was marked with a suture for orientation and sent to pathology for permanent section because any additional positive margins I would've wanted to allow the area to heal and consider a  focal surgery rather than a more extensive surgery at this time which would likely require a flap or skin graft for closure.  The wound was irrigated thoroughly with saline and the subcutaneous tissues were closed with interrupted 4-0 Vicryl and interrupted 5-0 Vicryl on the lobule.  Running 5-0 nylon was used to close the skin.  Bacitracin ointment was applied procedure was terminated patient procedure well.  She was instructed local wound care (along with the person accompanying her) and advised return in 1 week for suture removal.

## 2018-11-14 ENCOUNTER — OFFICE VISIT (OUTPATIENT)
Dept: OTOLARYNGOLOGY | Facility: CLINIC | Age: 83
End: 2018-11-14

## 2018-11-14 VITALS — BODY MASS INDEX: 31.53 KG/M2 | WEIGHT: 167 LBS | RESPIRATION RATE: 17 BRPM | HEIGHT: 61 IN

## 2018-11-14 DIAGNOSIS — Z48.817 AFTERCARE FOLLOWING SURGERY OF THE SKIN OR SUBCUTANEOUS TISSUE: Primary | ICD-10-CM

## 2018-11-14 PROCEDURE — 99024 POSTOP FOLLOW-UP VISIT: CPT | Performed by: OTOLARYNGOLOGY

## 2018-11-14 NOTE — PROGRESS NOTES
Subjective   Emily Valdez is a 89 y.o. female.       History of Present Illness   Patient is 1 week status post excision of basal cell carcinoma the left neck.  Final pathology showed clear margins.  She is having no specific complaints.      The following portions of the patient's history were reviewed and updated as appropriate: allergies, current medications, past family history, past medical history, past social history, past surgical history and problem list.     reports that she quit smoking about 42 years ago. she has never used smokeless tobacco. She reports that she does not drink alcohol or use drugs.   Patient is not a tobacco user and has not been counseled for use of tobacco products      Review of Systems        Objective   Physical Exam neck incision intact no evidence of infection.  All external sutures removed.    Assessment/Plan   Emily was seen today for post-op.    Diagnoses and all orders for this visit:    Aftercare following surgery of the skin or subcutaneous tissue        Plan: Sutures removed as described above.  May follow up with me as needed.

## 2018-11-30 DIAGNOSIS — K21.9 GASTROESOPHAGEAL REFLUX DISEASE WITHOUT ESOPHAGITIS: ICD-10-CM

## 2018-12-02 RX ORDER — CHLORDIAZEPOXIDE HYDROCHLORIDE AND CLIDINIUM BROMIDE 5; 2.5 MG/1; MG/1
CAPSULE ORAL
Qty: 60 CAPSULE | Refills: 2 | Status: SHIPPED | OUTPATIENT
Start: 2018-12-02 | End: 2018-12-02 | Stop reason: SDUPTHER

## 2018-12-02 RX ORDER — CHLORDIAZEPOXIDE HYDROCHLORIDE AND CLIDINIUM BROMIDE 5; 2.5 MG/1; MG/1
1 CAPSULE ORAL 2 TIMES DAILY
Qty: 60 CAPSULE | Refills: 2 | Status: SHIPPED | OUTPATIENT
Start: 2018-12-02 | End: 2019-05-09

## 2019-01-28 ENCOUNTER — LAB (OUTPATIENT)
Dept: LAB | Facility: OTHER | Age: 84
End: 2019-01-28

## 2019-01-28 DIAGNOSIS — I10 ESSENTIAL HYPERTENSION: ICD-10-CM

## 2019-01-28 DIAGNOSIS — D50.8 IRON DEFICIENCY ANEMIA SECONDARY TO INADEQUATE DIETARY IRON INTAKE: ICD-10-CM

## 2019-01-28 DIAGNOSIS — E78.2 MIXED HYPERLIPIDEMIA: Chronic | ICD-10-CM

## 2019-01-28 LAB
ALBUMIN SERPL-MCNC: 4.2 G/DL (ref 3.5–5)
ALBUMIN/GLOB SERPL: 1.4 G/DL (ref 1.1–1.8)
ALP SERPL-CCNC: 76 U/L (ref 38–126)
ALT SERPL W P-5'-P-CCNC: 13 U/L
ANION GAP SERPL CALCULATED.3IONS-SCNC: 6 MMOL/L (ref 5–15)
AST SERPL-CCNC: 35 U/L (ref 14–36)
BACTERIA UR QL AUTO: ABNORMAL /HPF
BASOPHILS # BLD AUTO: 0.02 10*3/MM3 (ref 0–0.2)
BASOPHILS NFR BLD AUTO: 0.3 % (ref 0–2)
BILIRUB SERPL-MCNC: 0.6 MG/DL (ref 0.2–1.3)
BILIRUB UR QL STRIP: NEGATIVE
BUN BLD-MCNC: 23 MG/DL (ref 7–17)
BUN/CREAT SERPL: 20.5 (ref 7–25)
CALCIUM SPEC-SCNC: 9 MG/DL (ref 8.4–10.2)
CHLORIDE SERPL-SCNC: 106 MMOL/L (ref 98–107)
CHOLEST SERPL-MCNC: 104 MG/DL (ref 150–200)
CLARITY UR: ABNORMAL
CO2 SERPL-SCNC: 30 MMOL/L (ref 22–30)
COLOR UR: YELLOW
CREAT BLD-MCNC: 1.12 MG/DL (ref 0.52–1.04)
DEPRECATED RDW RBC AUTO: 48.7 FL (ref 36.4–46.3)
EOSINOPHIL # BLD AUTO: 0.32 10*3/MM3 (ref 0–0.7)
EOSINOPHIL NFR BLD AUTO: 4.8 % (ref 0–7)
ERYTHROCYTE [DISTWIDTH] IN BLOOD BY AUTOMATED COUNT: 14.5 % (ref 11.5–14.5)
GFR SERPL CREATININE-BSD FRML MDRD: 45 ML/MIN/1.73 (ref 39–90)
GLOBULIN UR ELPH-MCNC: 3.1 GM/DL (ref 2.3–3.5)
GLUCOSE BLD-MCNC: 94 MG/DL (ref 74–99)
GLUCOSE UR STRIP-MCNC: NEGATIVE MG/DL
HCT VFR BLD AUTO: 33 % (ref 35–45)
HDLC SERPL-MCNC: 57 MG/DL (ref 40–59)
HGB BLD-MCNC: 10.4 G/DL (ref 12–15.5)
HGB UR QL STRIP.AUTO: NEGATIVE
HYALINE CASTS UR QL AUTO: ABNORMAL /LPF
KETONES UR QL STRIP: NEGATIVE
LDLC SERPL CALC-MCNC: 28 MG/DL
LDLC/HDLC SERPL: 0.49 {RATIO} (ref 0–3.22)
LEUKOCYTE ESTERASE UR QL STRIP.AUTO: ABNORMAL
LYMPHOCYTES # BLD AUTO: 2.64 10*3/MM3 (ref 0.6–4.2)
LYMPHOCYTES NFR BLD AUTO: 39.8 % (ref 10–50)
MCH RBC QN AUTO: 30.5 PG (ref 26.5–34)
MCHC RBC AUTO-ENTMCNC: 31.5 G/DL (ref 31.4–36)
MCV RBC AUTO: 96.8 FL (ref 80–98)
MONOCYTES # BLD AUTO: 0.47 10*3/MM3 (ref 0–0.9)
MONOCYTES NFR BLD AUTO: 7.1 % (ref 0–12)
NEUTROPHILS # BLD AUTO: 3.18 10*3/MM3 (ref 2–8.6)
NEUTROPHILS NFR BLD AUTO: 48 % (ref 37–80)
NITRITE UR QL STRIP: NEGATIVE
PH UR STRIP.AUTO: 7 [PH] (ref 5.5–8)
PLATELET # BLD AUTO: 145 10*3/MM3 (ref 150–450)
PMV BLD AUTO: 10.9 FL (ref 8–12)
POTASSIUM BLD-SCNC: 4.7 MMOL/L (ref 3.4–5)
PROT SERPL-MCNC: 7.3 G/DL (ref 6.3–8.2)
PROT UR QL STRIP: NEGATIVE
RBC # BLD AUTO: 3.41 10*6/MM3 (ref 3.77–5.16)
RBC # UR: ABNORMAL /HPF
REF LAB TEST METHOD: ABNORMAL
SODIUM BLD-SCNC: 142 MMOL/L (ref 137–145)
SP GR UR STRIP: 1.01 (ref 1–1.03)
SQUAMOUS #/AREA URNS HPF: ABNORMAL /HPF
T4 FREE SERPL-MCNC: 1.21 NG/DL (ref 0.78–2.19)
TRIGL SERPL-MCNC: 96 MG/DL
TSH SERPL DL<=0.05 MIU/L-ACNC: 2.06 MIU/ML (ref 0.46–4.68)
UROBILINOGEN UR QL STRIP: ABNORMAL
VLDLC SERPL-MCNC: 19.2 MG/DL
WBC NRBC COR # BLD: 6.63 10*3/MM3 (ref 3.2–9.8)
WBC UR QL AUTO: ABNORMAL /HPF

## 2019-01-28 PROCEDURE — 80053 COMPREHEN METABOLIC PANEL: CPT | Performed by: INTERNAL MEDICINE

## 2019-01-28 PROCEDURE — 84443 ASSAY THYROID STIM HORMONE: CPT | Performed by: INTERNAL MEDICINE

## 2019-01-28 PROCEDURE — 84439 ASSAY OF FREE THYROXINE: CPT | Performed by: INTERNAL MEDICINE

## 2019-01-28 PROCEDURE — 36415 COLL VENOUS BLD VENIPUNCTURE: CPT | Performed by: INTERNAL MEDICINE

## 2019-01-28 PROCEDURE — 85025 COMPLETE CBC W/AUTO DIFF WBC: CPT | Performed by: INTERNAL MEDICINE

## 2019-01-28 PROCEDURE — 87086 URINE CULTURE/COLONY COUNT: CPT | Performed by: INTERNAL MEDICINE

## 2019-01-28 PROCEDURE — 80061 LIPID PANEL: CPT | Performed by: INTERNAL MEDICINE

## 2019-01-28 PROCEDURE — 81001 URINALYSIS AUTO W/SCOPE: CPT | Performed by: INTERNAL MEDICINE

## 2019-01-30 LAB — BACTERIA SPEC AEROBE CULT: NORMAL

## 2019-02-06 DIAGNOSIS — K21.9 GASTROESOPHAGEAL REFLUX DISEASE WITHOUT ESOPHAGITIS: ICD-10-CM

## 2019-02-07 RX ORDER — CHLORDIAZEPOXIDE HYDROCHLORIDE AND CLIDINIUM BROMIDE 5; 2.5 MG/1; MG/1
CAPSULE ORAL
Qty: 60 CAPSULE | Refills: 2 | OUTPATIENT
Start: 2019-02-07

## 2019-02-08 ENCOUNTER — OFFICE VISIT (OUTPATIENT)
Dept: FAMILY MEDICINE CLINIC | Facility: CLINIC | Age: 84
End: 2019-02-08

## 2019-02-08 VITALS
SYSTOLIC BLOOD PRESSURE: 128 MMHG | WEIGHT: 167 LBS | HEIGHT: 61 IN | DIASTOLIC BLOOD PRESSURE: 68 MMHG | BODY MASS INDEX: 31.53 KG/M2 | TEMPERATURE: 98.8 F | HEART RATE: 62 BPM

## 2019-02-08 DIAGNOSIS — M15.9 PRIMARY OSTEOARTHRITIS INVOLVING MULTIPLE JOINTS: Chronic | ICD-10-CM

## 2019-02-08 DIAGNOSIS — F41.1 GENERALIZED ANXIETY DISORDER: Chronic | ICD-10-CM

## 2019-02-08 DIAGNOSIS — I10 ESSENTIAL HYPERTENSION: Primary | Chronic | ICD-10-CM

## 2019-02-08 DIAGNOSIS — K21.9 GASTROESOPHAGEAL REFLUX DISEASE WITHOUT ESOPHAGITIS: Chronic | ICD-10-CM

## 2019-02-08 DIAGNOSIS — D50.8 IRON DEFICIENCY ANEMIA SECONDARY TO INADEQUATE DIETARY IRON INTAKE: Chronic | ICD-10-CM

## 2019-02-08 DIAGNOSIS — E78.2 MIXED HYPERLIPIDEMIA: Chronic | ICD-10-CM

## 2019-02-08 PROCEDURE — 99214 OFFICE O/P EST MOD 30 MIN: CPT | Performed by: INTERNAL MEDICINE

## 2019-02-08 NOTE — PROGRESS NOTES
Chief Complaint   Patient presents with   • Hypertension     6 mo f/u with labs   • Hyperlipidemia     Subjective   Emily Valdez is a 89 y.o. female who presents to the office for follow-up and review of labs. She has hypertension and her blood pressure has been controlled. She has hyperlipidemia and takes Crestor daily. She has GERD, and uses Tums as needed. She has macular degeneration and sees ophthalmology on a regular basis.  She continues to have worsening of her eyesight, and this often causes a lot of stress and anxiety.  She has iron deficiency anemia, and takes a daily multivitamin with iron.  She was previously taking a separate iron supplement.  This was causing nausea and she discontinued it.  She has osteoarthritis and takes over-the-counter NSAIDs as needed.     The following portions of the patient's history were reviewed and updated as appropriate: allergies, current medications, past family history, past medical history, past social history, past surgical history and problem list.    Review of Systems   Constitutional: Negative for chills, fatigue and fever.   HENT: Negative for congestion, sneezing, sore throat and trouble swallowing.    Eyes: Negative for visual disturbance.   Respiratory: Negative for cough, chest tightness, shortness of breath and wheezing.    Cardiovascular: Negative for chest pain, palpitations and leg swelling.   Gastrointestinal: Negative for constipation, diarrhea, nausea and vomiting.   Genitourinary: Negative for dysuria, frequency and urgency.   Musculoskeletal: Negative for neck pain.   Skin: Negative for rash.   Neurological: Negative for weakness and headaches.   Psychiatric/Behavioral:        Patient denies any feelings of depression and has not felt down, hopeless or lost interest in any activities.       Objective   Vitals:    02/08/19 1320   BP: 128/68   BP Location: Right arm   Patient Position: Sitting   Cuff Size: Adult   Pulse: 62   Temp: 98.8 °F (37.1  "°C)   TempSrc: Oral   Weight: 75.8 kg (167 lb)   Height: 154.9 cm (61\")   PainSc: 0-No pain     Physical Exam   Constitutional: She is oriented to person, place, and time. She appears well-developed and well-nourished.   HENT:   Head: Normocephalic and atraumatic.   Nose: Nose normal.   Mouth/Throat: Oropharynx is clear and moist. No oropharyngeal exudate.   Eyes: Conjunctivae and EOM are normal. Pupils are equal, round, and reactive to light. Right eye exhibits no discharge. Left eye exhibits no discharge. No scleral icterus.   She is wearing dark sunglasses secondary to her macular degeneration and glaucoma   Neck: Normal range of motion. Neck supple. No thyromegaly present.   Cardiovascular: Normal rate, regular rhythm, normal heart sounds and intact distal pulses. Exam reveals no gallop and no friction rub.   No murmur heard.  Pulmonary/Chest: Effort normal and breath sounds normal. No respiratory distress. She has no wheezes. She has no rales.   Abdominal: Soft. Bowel sounds are normal. She exhibits no distension. There is no tenderness. There is no rebound and no guarding.   Musculoskeletal: She exhibits no edema.   Lymphadenopathy:     She has no cervical adenopathy.   Neurological: She is alert and oriented to person, place, and time. No cranial nerve deficit. Gait abnormal.   Patient walks with the aid of a walker   Skin: Skin is warm and dry. No rash noted.   Psychiatric: She has a normal mood and affect. Her behavior is normal. Judgment and thought content normal.   Nursing note and vitals reviewed.      Assessment/Plan   Emily was seen today for hypertension and hyperlipidemia.    Diagnoses and all orders for this visit:    Essential hypertension  -     CBC & Differential; Future  -     Comprehensive Metabolic Panel; Future  -     T4, Free; Future  -     TSH; Future  -     Urinalysis With Culture If Indicated - Urine, Clean Catch; Future    Mixed hyperlipidemia  -     LDL Cholesterol, Direct; " Future    Iron deficiency anemia secondary to inadequate dietary iron intake    Generalized anxiety disorder    Gastroesophageal reflux disease without esophagitis    Primary osteoarthritis involving multiple joints         Labs are reviewed with patient.  Her anemia is stable. Her hemoglobin is decreased but baseline at 10.4. Her lipids are at goal, and she will continue with Crestor.  Her blood pressure is controlled, and she will continue with her current blood pressure medication.  She will continue with Phenergan and Librax as needed for her GI symptoms.   She will continue with Tylenol or other OTC NSAID's as needed for her osteoarthritis.    PHQ-2/PHQ-9 Depression Screening 2/8/2019   Little interest or pleasure in doing things 0   Feeling down, depressed, or hopeless 0   Trouble falling or staying asleep, or sleeping too much -   Feeling tired or having little energy -   Poor appetite or overeating -   Feeling bad about yourself - or that you are a failure or have let yourself or your family down -   Trouble concentrating on things, such as reading the newspaper or watching television -   Moving or speaking so slowly that other people could have noticed. Or the opposite - being so fidgety or restless that you have been moving around a lot more than usual -   Thoughts that you would be better off dead, or of hurting yourself in some way -   Total Score 0   If you checked off any problems, how difficult have these problems made it for you to do your work, take care of things at home, or get along with other people? -         Lab on 01/28/2019   Component Date Value Ref Range Status   • Glucose 01/28/2019 94  74 - 99 mg/dL Final   • BUN 01/28/2019 23* 7 - 17 mg/dL Final   • Creatinine 01/28/2019 1.12* 0.52 - 1.04 mg/dL Final   • Sodium 01/28/2019 142  137 - 145 mmol/L Final   • Potassium 01/28/2019 4.7  3.4 - 5.0 mmol/L Final   • Chloride 01/28/2019 106  98 - 107 mmol/L Final   • CO2 01/28/2019 30.0  22.0 -  30.0 mmol/L Final   • Calcium 01/28/2019 9.0  8.4 - 10.2 mg/dL Final   • Total Protein 01/28/2019 7.3  6.3 - 8.2 g/dL Final   • Albumin 01/28/2019 4.20  3.50 - 5.00 g/dL Final   • ALT (SGPT) 01/28/2019 13  <=35 U/L Final   • AST (SGOT) 01/28/2019 35  14 - 36 U/L Final   • Alkaline Phosphatase 01/28/2019 76  38 - 126 U/L Final   • Total Bilirubin 01/28/2019 0.6  0.2 - 1.3 mg/dL Final   • eGFR Non African Amer 01/28/2019 45  39 - 90 mL/min/1.73 Final   • Globulin 01/28/2019 3.1  2.3 - 3.5 gm/dL Final   • A/G Ratio 01/28/2019 1.4  1.1 - 1.8 g/dL Final   • BUN/Creatinine Ratio 01/28/2019 20.5  7.0 - 25.0 Final   • Anion Gap 01/28/2019 6.0  5.0 - 15.0 mmol/L Final   • Total Cholesterol 01/28/2019 104* 150 - 200 mg/dL Final   • Triglycerides 01/28/2019 96  <=150 mg/dL Final   • HDL Cholesterol 01/28/2019 57  40 - 59 mg/dL Final   • LDL Cholesterol  01/28/2019 28  <=100 mg/dL Final   • VLDL Cholesterol 01/28/2019 19.2  mg/dL Final   • LDL/HDL Ratio 01/28/2019 0.49  0.00 - 3.22 Final   • Free T4 01/28/2019 1.21  0.78 - 2.19 ng/dL Final   • TSH 01/28/2019 2.060  0.460 - 4.680 mIU/mL Final   • Color, UA 01/28/2019 Yellow  Yellow, Straw Final   • Appearance, UA 01/28/2019 Cloudy* Clear Final   • pH, UA 01/28/2019 7.0  5.5 - 8.0 Final   • Specific Gravity, UA 01/28/2019 1.015  1.005 - 1.030 Final   • Glucose, UA 01/28/2019 Negative  Negative Final   • Ketones, UA 01/28/2019 Negative  Negative Final   • Bilirubin, UA 01/28/2019 Negative  Negative Final   • Blood, UA 01/28/2019 Negative  Negative Final   • Protein, UA 01/28/2019 Negative  Negative Final   • Leuk Esterase, UA 01/28/2019 Small (1+)* Negative Final   • Nitrite, UA 01/28/2019 Negative  Negative Final   • Urobilinogen, UA 01/28/2019 0.2 E.U./dL  0.2 - 1.0 E.U./dL Final   • WBC 01/28/2019 6.63  3.20 - 9.80 10*3/mm3 Final   • RBC 01/28/2019 3.41* 3.77 - 5.16 10*6/mm3 Final   • Hemoglobin 01/28/2019 10.4* 12.0 - 15.5 g/dL Final   • Hematocrit 01/28/2019 33.0* 35.0 - 45.0  % Final   • MCV 01/28/2019 96.8  80.0 - 98.0 fL Final   • MCH 01/28/2019 30.5  26.5 - 34.0 pg Final   • MCHC 01/28/2019 31.5  31.4 - 36.0 g/dL Final   • RDW 01/28/2019 14.5  11.5 - 14.5 % Final   • RDW-SD 01/28/2019 48.7* 36.4 - 46.3 fl Final   • MPV 01/28/2019 10.9  8.0 - 12.0 fL Final   • Platelets 01/28/2019 145* 150 - 450 10*3/mm3 Final   • Neutrophil % 01/28/2019 48.0  37.0 - 80.0 % Final   • Lymphocyte % 01/28/2019 39.8  10.0 - 50.0 % Final   • Monocyte % 01/28/2019 7.1  0.0 - 12.0 % Final   • Eosinophil % 01/28/2019 4.8  0.0 - 7.0 % Final   • Basophil % 01/28/2019 0.3  0.0 - 2.0 % Final   • Neutrophils, Absolute 01/28/2019 3.18  2.00 - 8.60 10*3/mm3 Final   • Lymphocytes, Absolute 01/28/2019 2.64  0.60 - 4.20 10*3/mm3 Final   • Monocytes, Absolute 01/28/2019 0.47  0.00 - 0.90 10*3/mm3 Final   • Eosinophils, Absolute 01/28/2019 0.32  0.00 - 0.70 10*3/mm3 Final   • Basophils, Absolute 01/28/2019 0.02  0.00 - 0.20 10*3/mm3 Final   • RBC, UA 01/28/2019 None Seen  None Seen /HPF Final   • WBC, UA 01/28/2019 6-12* None Seen /HPF Final   • Bacteria, UA 01/28/2019 4+* None Seen /HPF Final   • Squamous Epithelial Cells, UA 01/28/2019 0-2  None Seen, 0-2 /HPF Final   • Hyaline Casts, UA 01/28/2019 None Seen  None Seen /LPF Final   • Methodology 01/28/2019 Manual Light Microscopy   Final   • Urine Culture 01/28/2019 Mixed Vidya Isolated   Final   ]

## 2019-02-27 DIAGNOSIS — E78.5 HYPERLIPIDEMIA, UNSPECIFIED HYPERLIPIDEMIA TYPE: ICD-10-CM

## 2019-02-28 RX ORDER — ROSUVASTATIN CALCIUM 5 MG/1
TABLET, COATED ORAL
Qty: 90 TABLET | Refills: 1 | Status: SHIPPED | OUTPATIENT
Start: 2019-02-28 | End: 2019-08-30 | Stop reason: SDUPTHER

## 2019-03-08 DIAGNOSIS — K21.9 GASTROESOPHAGEAL REFLUX DISEASE WITHOUT ESOPHAGITIS: ICD-10-CM

## 2019-03-08 RX ORDER — CHLORDIAZEPOXIDE HYDROCHLORIDE AND CLIDINIUM BROMIDE 5; 2.5 MG/1; MG/1
CAPSULE ORAL
Qty: 60 CAPSULE | Refills: 2 | Status: SHIPPED | OUTPATIENT
Start: 2019-03-08 | End: 2019-05-09 | Stop reason: SDUPTHER

## 2019-03-08 NOTE — TELEPHONE ENCOUNTER
Patient is UTD from office visit, DEREK, and controlled consent on 2-8-2019. Next office visit is scheduled for 8-9-2019. Last refill was on 12-2-2018 with 2 refills.

## 2019-04-15 DIAGNOSIS — I10 ESSENTIAL HYPERTENSION: Chronic | ICD-10-CM

## 2019-04-16 RX ORDER — LISINOPRIL 10 MG/1
10 TABLET ORAL DAILY
Qty: 90 TABLET | Refills: 3 | Status: SHIPPED | OUTPATIENT
Start: 2019-04-16 | End: 2020-02-04

## 2019-05-09 DIAGNOSIS — K21.9 GASTROESOPHAGEAL REFLUX DISEASE WITHOUT ESOPHAGITIS: ICD-10-CM

## 2019-05-09 RX ORDER — CHLORDIAZEPOXIDE HYDROCHLORIDE AND CLIDINIUM BROMIDE 5; 2.5 MG/1; MG/1
CAPSULE ORAL
Qty: 60 CAPSULE | Refills: 5 | Status: SHIPPED | OUTPATIENT
Start: 2019-05-09 | End: 2019-12-05 | Stop reason: SDUPTHER

## 2019-05-09 NOTE — TELEPHONE ENCOUNTER
Last office visit 2-8-19. Next office visit is scheduled for 8-9-19. Last refill was on 12-2-18 with 2 refills.

## 2019-08-06 ENCOUNTER — LAB (OUTPATIENT)
Dept: LAB | Facility: OTHER | Age: 84
End: 2019-08-06

## 2019-08-06 DIAGNOSIS — E78.2 MIXED HYPERLIPIDEMIA: Chronic | ICD-10-CM

## 2019-08-06 DIAGNOSIS — I10 ESSENTIAL HYPERTENSION: ICD-10-CM

## 2019-08-06 LAB
ALBUMIN SERPL-MCNC: 4.3 G/DL (ref 3.5–5)
ALBUMIN/GLOB SERPL: 1.6 G/DL (ref 1.1–1.8)
ALP SERPL-CCNC: 77 U/L (ref 38–126)
ALT SERPL W P-5'-P-CCNC: 18 U/L
ANION GAP SERPL CALCULATED.3IONS-SCNC: 8 MMOL/L (ref 5–15)
ARTICHOKE IGE QN: 60 MG/DL (ref 0–100)
AST SERPL-CCNC: 35 U/L (ref 14–36)
BACTERIA UR QL AUTO: ABNORMAL /HPF
BASOPHILS # BLD AUTO: 0.03 10*3/MM3 (ref 0–0.2)
BASOPHILS NFR BLD AUTO: 0.4 % (ref 0–1.5)
BILIRUB SERPL-MCNC: 0.2 MG/DL (ref 0.2–1.3)
BILIRUB UR QL STRIP: NEGATIVE
BUN BLD-MCNC: 25 MG/DL (ref 7–23)
BUN/CREAT SERPL: 20.3 (ref 7–25)
CALCIUM SPEC-SCNC: 9.5 MG/DL (ref 8.4–10.2)
CHLORIDE SERPL-SCNC: 107 MMOL/L (ref 101–112)
CLARITY UR: ABNORMAL
CO2 SERPL-SCNC: 30 MMOL/L (ref 22–30)
COLOR UR: YELLOW
CREAT BLD-MCNC: 1.23 MG/DL (ref 0.52–1.04)
DEPRECATED RDW RBC AUTO: 45.4 FL (ref 37–54)
EOSINOPHIL # BLD AUTO: 0.5 10*3/MM3 (ref 0–0.4)
EOSINOPHIL NFR BLD AUTO: 6 % (ref 0.3–6.2)
ERYTHROCYTE [DISTWIDTH] IN BLOOD BY AUTOMATED COUNT: 13.6 % (ref 12.3–15.4)
GFR SERPL CREATININE-BSD FRML MDRD: 41 ML/MIN/1.73 (ref 39–90)
GLOBULIN UR ELPH-MCNC: 2.7 GM/DL (ref 2.3–3.5)
GLUCOSE BLD-MCNC: 94 MG/DL (ref 70–99)
GLUCOSE UR STRIP-MCNC: NEGATIVE MG/DL
HCT VFR BLD AUTO: 34.1 % (ref 34–46.6)
HGB BLD-MCNC: 11.1 G/DL (ref 12–15.9)
HGB UR QL STRIP.AUTO: ABNORMAL
HYALINE CASTS UR QL AUTO: ABNORMAL /LPF
KETONES UR QL STRIP: NEGATIVE
LEUKOCYTE ESTERASE UR QL STRIP.AUTO: ABNORMAL
LYMPHOCYTES # BLD AUTO: 2.71 10*3/MM3 (ref 0.7–3.1)
LYMPHOCYTES NFR BLD AUTO: 32.5 % (ref 19.6–45.3)
MCH RBC QN AUTO: 31 PG (ref 26.6–33)
MCHC RBC AUTO-ENTMCNC: 32.6 G/DL (ref 31.5–35.7)
MCV RBC AUTO: 95.3 FL (ref 79–97)
MONOCYTES # BLD AUTO: 0.59 10*3/MM3 (ref 0.1–0.9)
MONOCYTES NFR BLD AUTO: 7.1 % (ref 5–12)
NEUTROPHILS # BLD AUTO: 4.52 10*3/MM3 (ref 1.7–7)
NEUTROPHILS NFR BLD AUTO: 54 % (ref 42.7–76)
NITRITE UR QL STRIP: NEGATIVE
PH UR STRIP.AUTO: 7 [PH] (ref 5.5–8)
PLATELET # BLD AUTO: 159 10*3/MM3 (ref 140–450)
PMV BLD AUTO: 10.6 FL (ref 6–12)
POTASSIUM BLD-SCNC: 5.1 MMOL/L (ref 3.4–5)
PROT SERPL-MCNC: 7 G/DL (ref 6.3–8.6)
PROT UR QL STRIP: NEGATIVE
RBC # BLD AUTO: 3.58 10*6/MM3 (ref 3.77–5.28)
RBC # UR: ABNORMAL /HPF
REF LAB TEST METHOD: ABNORMAL
SODIUM BLD-SCNC: 145 MMOL/L (ref 137–145)
SP GR UR STRIP: 1.01 (ref 1–1.03)
SQUAMOUS #/AREA URNS HPF: ABNORMAL /HPF
T4 FREE SERPL-MCNC: 1.25 NG/DL (ref 0.93–1.7)
TSH SERPL DL<=0.05 MIU/L-ACNC: 3.31 MIU/ML (ref 0.27–4.2)
UROBILINOGEN UR QL STRIP: ABNORMAL
WBC NRBC COR # BLD: 8.35 10*3/MM3 (ref 3.4–10.8)
WBC UR QL AUTO: ABNORMAL /HPF

## 2019-08-06 PROCEDURE — 84439 ASSAY OF FREE THYROXINE: CPT | Performed by: INTERNAL MEDICINE

## 2019-08-06 PROCEDURE — 83721 ASSAY OF BLOOD LIPOPROTEIN: CPT | Performed by: INTERNAL MEDICINE

## 2019-08-06 PROCEDURE — 87086 URINE CULTURE/COLONY COUNT: CPT | Performed by: INTERNAL MEDICINE

## 2019-08-06 PROCEDURE — 36415 COLL VENOUS BLD VENIPUNCTURE: CPT | Performed by: INTERNAL MEDICINE

## 2019-08-06 PROCEDURE — 85025 COMPLETE CBC W/AUTO DIFF WBC: CPT | Performed by: INTERNAL MEDICINE

## 2019-08-06 PROCEDURE — 81001 URINALYSIS AUTO W/SCOPE: CPT | Performed by: INTERNAL MEDICINE

## 2019-08-06 PROCEDURE — 80053 COMPREHEN METABOLIC PANEL: CPT | Performed by: INTERNAL MEDICINE

## 2019-08-06 PROCEDURE — 84443 ASSAY THYROID STIM HORMONE: CPT | Performed by: INTERNAL MEDICINE

## 2019-08-07 LAB — BACTERIA SPEC AEROBE CULT: NO GROWTH

## 2019-08-09 ENCOUNTER — OFFICE VISIT (OUTPATIENT)
Dept: FAMILY MEDICINE CLINIC | Facility: CLINIC | Age: 84
End: 2019-08-09

## 2019-08-09 VITALS
SYSTOLIC BLOOD PRESSURE: 122 MMHG | HEART RATE: 72 BPM | DIASTOLIC BLOOD PRESSURE: 70 MMHG | TEMPERATURE: 97.1 F | BODY MASS INDEX: 29.07 KG/M2 | WEIGHT: 154 LBS | HEIGHT: 61 IN

## 2019-08-09 DIAGNOSIS — D50.8 IRON DEFICIENCY ANEMIA SECONDARY TO INADEQUATE DIETARY IRON INTAKE: Chronic | ICD-10-CM

## 2019-08-09 DIAGNOSIS — E78.2 MIXED HYPERLIPIDEMIA: Chronic | ICD-10-CM

## 2019-08-09 DIAGNOSIS — I10 ESSENTIAL HYPERTENSION: Chronic | ICD-10-CM

## 2019-08-09 DIAGNOSIS — M15.9 PRIMARY OSTEOARTHRITIS INVOLVING MULTIPLE JOINTS: Chronic | ICD-10-CM

## 2019-08-09 DIAGNOSIS — Z00.00 MEDICARE ANNUAL WELLNESS VISIT, SUBSEQUENT: Primary | ICD-10-CM

## 2019-08-09 DIAGNOSIS — R21 RASH: ICD-10-CM

## 2019-08-09 DIAGNOSIS — F41.1 GENERALIZED ANXIETY DISORDER: Chronic | ICD-10-CM

## 2019-08-09 DIAGNOSIS — K21.9 GASTROESOPHAGEAL REFLUX DISEASE WITHOUT ESOPHAGITIS: Chronic | ICD-10-CM

## 2019-08-09 PROCEDURE — 99214 OFFICE O/P EST MOD 30 MIN: CPT | Performed by: INTERNAL MEDICINE

## 2019-08-09 PROCEDURE — G0439 PPPS, SUBSEQ VISIT: HCPCS | Performed by: INTERNAL MEDICINE

## 2019-08-09 RX ORDER — METOPROLOL SUCCINATE 25 MG/1
25 TABLET, EXTENDED RELEASE ORAL DAILY
Qty: 90 TABLET | Refills: 3 | Status: SHIPPED | OUTPATIENT
Start: 2019-08-09 | End: 2020-11-05

## 2019-08-09 RX ORDER — TRIAMCINOLONE ACETONIDE 1 MG/G
CREAM TOPICAL 2 TIMES DAILY
Qty: 80 G | Refills: 0 | Status: SHIPPED | OUTPATIENT
Start: 2019-08-09 | End: 2020-08-21 | Stop reason: SDUPTHER

## 2019-08-09 NOTE — PROGRESS NOTES
QUICK REFERENCE INFORMATION:  The ABCs of the Annual Wellness Visit    Medicare Wellness Visit    HEALTH RISK ASSESSMENT    1924    Recent Hospitalizations:  No hospitalization(s) within the last year..        Current Medical Providers:  Patient Care Team:  Vaibhav Mai MD as PCP - General (Family Medicine)  Francis Calvillo (Ophthalmology)        Smoking Status:  Social History     Tobacco Use   Smoking Status Former Smoker   • Last attempt to quit: 1976   • Years since quittin.9   Smokeless Tobacco Never Used       Alcohol Consumption:  Social History     Substance and Sexual Activity   Alcohol Use No       Depression Screen:   PHQ-2/PHQ-9 Depression Screening 2019   Little interest or pleasure in doing things 0   Feeling down, depressed, or hopeless 0   Trouble falling or staying asleep, or sleeping too much -   Feeling tired or having little energy -   Poor appetite or overeating -   Feeling bad about yourself - or that you are a failure or have let yourself or your family down -   Trouble concentrating on things, such as reading the newspaper or watching television -   Moving or speaking so slowly that other people could have noticed. Or the opposite - being so fidgety or restless that you have been moving around a lot more than usual -   Thoughts that you would be better off dead, or of hurting yourself in some way -   Total Score 0   If you checked off any problems, how difficult have these problems made it for you to do your work, take care of things at home, or get along with other people? -       Health Habits and Functional and Cognitive Screening:  Functional & Cognitive Status 2019   Do you have difficulty preparing food and eating? Yes   Do you have difficulty bathing yourself, getting dressed or grooming yourself? Yes   Do you have difficulty using the toilet? No   Do you have difficulty moving around from place to place? Yes   Do you have trouble with steps or getting out of a  bed or a chair? Yes   Current Diet Unhealthy Diet   Dental Exam Not up to date   Eye Exam Up to date   Exercise (times per week) 4 times per week   Current Exercise Activities Include Stationary Bicycling/Spin Class   Do you need help using the phone?  No   Are you deaf or do you have serious difficulty hearing?  No   Do you need help with transportation? Yes   Do you need help shopping? Yes   Do you need help preparing meals?  Yes   Do you need help with housework?  Yes   Do you need help with laundry? Yes   Do you need help taking your medications? Yes   Do you need help managing money? No   Do you ever drive or ride in a car without wearing a seat belt? No   Have you felt unusual stress, anger or loneliness in the last month? No   Who do you live with? Spouse   If you need help, do you have trouble finding someone available to you? No   Have you been bothered in the last four weeks by sexual problems? No   Do you have difficulty concentrating, remembering or making decisions? Yes           Does the patient have evidence of cognitive impairment? No    Asiprin use counseling: Taking ASA appropriately as indicated      Recent Lab Results:    Visual Acuity:  No exam data present    Age-appropriate Screening Schedule:  Refer to the list below for future screening recommendations based on patient's age, sex and/or medical conditions. Orders for these recommended tests are listed in the plan section. The patient has been provided with a written plan.    Health Maintenance   Topic Date Due   • TDAP/TD VACCINES (1 - Tdap) 07/31/1943   • ZOSTER VACCINE (1 of 2) 07/31/1974   • DXA SCAN  08/22/2016   • INFLUENZA VACCINE  08/01/2019   • LIPID PANEL  08/06/2020   • PNEUMOCOCCAL VACCINES (65+ LOW/MEDIUM RISK)  Discontinued        Subjective   History of Present Illness    Emily Valdez is a 89 y.o. female who presents for an Annual Wellness Visit.    The following portions of the patient's history were reviewed and  updated as appropriate: allergies, current medications, past family history, past medical history, past social history, past surgical history and problem list.    Outpatient Medications Prior to Visit   Medication Sig Dispense Refill   • calcium carbonate (TUMS) 500 MG chewable tablet Chew 1 tablet Every Night.     • clidinium-chlordiazePOXIDE (LIBRAX) 5-2.5 MG per capsule TAKE 1 CAPSULE BY MOUTH TWO TIMES A DAY 60 capsule 5   • docusate sodium (COLACE) 100 MG capsule Take 100 mg by mouth Every Night.     • Fish Oil oil 1 capsule 3 (three) times a day.     • lisinopril (PRINIVIL,ZESTRIL) 10 MG tablet TAKE 1 TABLET BY MOUTH DAILY 90 tablet 3   • LUMIGAN 0.01 % ophthalmic drops 1 drop to each eye q hs       • magnesium oxide (MAGOX) 400 (241.3 MG) MG tablet tablet Take 1 tablet by mouth 2 (two) times a day. 60 tablet 5   • Multiple Vitamins-Minerals (CENTRUM SILVER 50+WOMEN PO) Take 1 tablet by mouth Daily.     • mupirocin (BACTROBAN) 2 % ointment Apply  topically 2 (Two) Times a Day. 15 g 1   • Polyvinyl Alcohol-Povidone (REFRESH OP) Apply  to eye.     • promethazine (PHENERGAN) 25 MG tablet TAKE 1 TABLET BY MOUTH EVERY 6 HOURS AS NEEDED FOR NAUSEA 30 tablet 1   • rosuvastatin (CRESTOR) 5 MG tablet TAKE 1 TABLET BY MOUTH EVERY EVENING 90 tablet 1   • metoprolol succinate XL (TOPROL-XL) 25 MG 24 hr tablet TAKE 1 TABLET BY MOUTH DAILY 30 tablet 5   • aspirin 81 MG EC tablet 1 tablet po three times weekly     • Multiple Vitamins-Minerals (OCUVITE PO) Take 1 tablet by mouth Daily.     • lisinopril (PRINIVIL,ZESTRIL) 10 MG tablet Take 0.5 tablets by mouth Daily. 90 tablet 3     No facility-administered medications prior to visit.        Patient Active Problem List   Diagnosis   • Primary osteoarthritis involving multiple joints   • Mixed hyperlipidemia   • Gastroesophageal reflux disease without esophagitis   • Essential hypertension   • Generalized anxiety disorder   • Diverticulitis of colon   • Iron deficiency anemia  "secondary to inadequate dietary iron intake   • Macular degeneration   • Glaucoma       Advance Care Planning:  has an advance directive - a copy HAS NOT been provided    Identification of Risk Factors:  Risk factors include: increased fall risk.    Review of Systems    Compared to one year ago, the patient feels her physical health is the same.  Compared to one year ago, the patient feels her mental health is the same.    Objective     Physical Exam    Vitals:    08/09/19 1324   BP: 122/70   BP Location: Left arm   Patient Position: Sitting   Cuff Size: Adult   Pulse: 72   Temp: 97.1 °F (36.2 °C)   TempSrc: Oral   Weight: 69.9 kg (154 lb)   Height: 154.9 cm (61\")   PainSc: 0-No pain       Patient's Body mass index is 29.1 kg/m². BMI is above normal parameters. Recommendations include: educational material.      Assessment/Plan   Patient Self-Management and Personalized Health Advice  The patient has been provided with information about: diet, exercise and weight management and preventive services including:   · Diabetes screening, see lab orders, Exercise counseling provided, Fall Risk assessment done, Nutrition counseling provided.    Visit Diagnoses:    ICD-10-CM ICD-9-CM   1. Medicare annual wellness visit, subsequent Z00.00 V70.0   2. Essential hypertension I10 401.9   3. Mixed hyperlipidemia E78.2 272.2   4. Gastroesophageal reflux disease without esophagitis K21.9 530.81   5. Primary osteoarthritis involving multiple joints M15.0 715.09   6. Iron deficiency anemia secondary to inadequate dietary iron intake D50.8 280.1   7. Generalized anxiety disorder F41.1 300.02   8. Rash R21 782.1       Orders Placed This Encounter   Procedures   • Comprehensive Metabolic Panel     Standing Status:   Future     Standing Expiration Date:   8/9/2020   • T4, Free     Standing Status:   Future     Standing Expiration Date:   8/9/2020   • TSH     Standing Status:   Future     Standing Expiration Date:   8/9/2020   • Urinalysis " With Culture If Indicated -     Standing Status:   Future     Standing Expiration Date:   8/9/2020   • Lipid Panel     Standing Status:   Future     Standing Expiration Date:   8/9/2020   • CBC & Differential     Standing Status:   Future     Standing Expiration Date:   8/9/2020     Order Specific Question:   Manual Differential     Answer:   No       Outpatient Encounter Medications as of 8/9/2019   Medication Sig Dispense Refill   • calcium carbonate (TUMS) 500 MG chewable tablet Chew 1 tablet Every Night.     • clidinium-chlordiazePOXIDE (LIBRAX) 5-2.5 MG per capsule TAKE 1 CAPSULE BY MOUTH TWO TIMES A DAY 60 capsule 5   • docusate sodium (COLACE) 100 MG capsule Take 100 mg by mouth Every Night.     • Fish Oil oil 1 capsule 3 (three) times a day.     • lisinopril (PRINIVIL,ZESTRIL) 10 MG tablet TAKE 1 TABLET BY MOUTH DAILY 90 tablet 3   • LUMIGAN 0.01 % ophthalmic drops 1 drop to each eye q hs       • magnesium oxide (MAGOX) 400 (241.3 MG) MG tablet tablet Take 1 tablet by mouth 2 (two) times a day. 60 tablet 5   • metoprolol succinate XL (TOPROL-XL) 25 MG 24 hr tablet Take 1 tablet by mouth Daily. 90 tablet 3   • Multiple Vitamins-Minerals (CENTRUM SILVER 50+WOMEN PO) Take 1 tablet by mouth Daily.     • mupirocin (BACTROBAN) 2 % ointment Apply  topically 2 (Two) Times a Day. 15 g 1   • Polyvinyl Alcohol-Povidone (REFRESH OP) Apply  to eye.     • promethazine (PHENERGAN) 25 MG tablet TAKE 1 TABLET BY MOUTH EVERY 6 HOURS AS NEEDED FOR NAUSEA 30 tablet 1   • rosuvastatin (CRESTOR) 5 MG tablet TAKE 1 TABLET BY MOUTH EVERY EVENING 90 tablet 1   • [DISCONTINUED] metoprolol succinate XL (TOPROL-XL) 25 MG 24 hr tablet TAKE 1 TABLET BY MOUTH DAILY 30 tablet 5   • aspirin 81 MG EC tablet 1 tablet po three times weekly     • Multiple Vitamins-Minerals (OCUVITE PO) Take 1 tablet by mouth Daily.     • triamcinolone (KENALOG) 0.1 % cream Apply  topically to the appropriate area as directed 2 (Two) Times a Day. 80 g 0   •  [DISCONTINUED] lisinopril (PRINIVIL,ZESTRIL) 10 MG tablet Take 0.5 tablets by mouth Daily. 90 tablet 3     No facility-administered encounter medications on file as of 8/9/2019.        Reviewed use of high risk medication in the elderly: yes  Reviewed for potential of harmful drug interactions in the elderly: yes    Follow Up:  Return in about 6 months (around 2/9/2020) for Next scheduled follow up, Or sooner as needed With Labs prior to appointment.     An After Visit Summary and PPPS with all of these plans were given to the patient.

## 2019-08-09 NOTE — PROGRESS NOTES
Chief Complaint   Patient presents with   • Medicare Wellness-subsequent   • Hypertension     6 mo f/u with labs   • Hyperlipidemia     Subjective   Emily Valdez is a 89 y.o. female who presents to the office for follow-up and review of labs. She has hypertension and her blood pressure has been controlled. She has hyperlipidemia and takes Crestor daily. She has GERD, and uses Tums as needed. She has macular degeneration and sees ophthalmology on a regular basis. She has iron deficiency anemia, and takes a daily multivitamin with iron. She has osteoarthritis and takes over-the-counter NSAIDs as needed.  She has anxiety which has been doing well.  She complains of a rash on her back which has been itching at times.  This started a couple of weeks ago.    The following portions of the patient's history were reviewed and updated as appropriate: allergies, current medications, past family history, past medical history, past social history, past surgical history and problem list.    Review of Systems   Constitutional: Negative for chills, fatigue and fever.   HENT: Negative for congestion, sneezing, sore throat and trouble swallowing.    Eyes: Negative for visual disturbance.   Respiratory: Negative for cough, chest tightness, shortness of breath and wheezing.    Cardiovascular: Negative for chest pain, palpitations and leg swelling.   Gastrointestinal: Negative for constipation, diarrhea, nausea and vomiting.   Genitourinary: Negative for dysuria, frequency and urgency.   Musculoskeletal: Negative for neck pain.   Skin: Positive for rash.   Neurological: Negative for weakness and headaches.   Psychiatric/Behavioral:        Patient denies any feelings of depression and has not felt down, hopeless or lost interest in any activities.       Objective   Vitals:    08/09/19 1324   BP: 122/70   BP Location: Left arm   Patient Position: Sitting   Cuff Size: Adult   Pulse: 72   Temp: 97.1 °F (36.2 °C)   TempSrc: Oral  "  Weight: 69.9 kg (154 lb)   Height: 154.9 cm (61\")   PainSc: 0-No pain     Physical Exam   Constitutional: She is oriented to person, place, and time. She appears well-developed and well-nourished.   HENT:   Head: Normocephalic and atraumatic.   Nose: Nose normal.   Mouth/Throat: Oropharynx is clear and moist. No oropharyngeal exudate.   Eyes: Conjunctivae and EOM are normal. Pupils are equal, round, and reactive to light. Right eye exhibits no discharge. Left eye exhibits no discharge. No scleral icterus.   She is wearing dark sunglasses secondary to her macular degeneration and glaucoma   Neck: Normal range of motion. Neck supple. No thyromegaly present.   Cardiovascular: Normal rate, regular rhythm, normal heart sounds and intact distal pulses. Exam reveals no gallop and no friction rub.   No murmur heard.  Pulmonary/Chest: Effort normal and breath sounds normal. No respiratory distress. She has no wheezes. She has no rales.   Abdominal: Soft. Bowel sounds are normal. She exhibits no distension. There is no tenderness. There is no rebound and no guarding.   Musculoskeletal: She exhibits no edema.   Lymphadenopathy:     She has no cervical adenopathy.   Neurological: She is alert and oriented to person, place, and time. No cranial nerve deficit. Gait abnormal.   Patient walks with the aid of a walker   Skin: Skin is warm and dry. Rash noted.   She has a small area of rash/erythema on her mid back.   Psychiatric: She has a normal mood and affect. Her behavior is normal. Judgment and thought content normal.   Nursing note and vitals reviewed.      Assessment/Plan   Emily was seen today for medicare wellness-subsequent, hypertension and hyperlipidemia.    Diagnoses and all orders for this visit:    Medicare annual wellness visit, subsequent    Essential hypertension  -     Comprehensive Metabolic Panel; Future  -     T4, Free; Future  -     TSH; Future  -     Urinalysis With Culture If Indicated -; Future  -     " metoprolol succinate XL (TOPROL-XL) 25 MG 24 hr tablet; Take 1 tablet by mouth Daily.    Mixed hyperlipidemia  -     Lipid Panel; Future    Gastroesophageal reflux disease without esophagitis    Primary osteoarthritis involving multiple joints    Iron deficiency anemia secondary to inadequate dietary iron intake  -     CBC & Differential; Future    Generalized anxiety disorder    Rash  Comments:  back  Orders:  -     triamcinolone (KENALOG) 0.1 % cream; Apply  topically to the appropriate area as directed 2 (Two) Times a Day.         Labs are reviewed with patient.  Her anemia is stable. Her hemoglobin is 11.1, which has improved from the previous visit. Her LDL is at goal, and she will continue with Crestor.  Her blood pressure is controlled, and she will continue with her current blood pressure medication.  She will continue with Phenergan and Librax as needed for her GI symptoms.   She will continue with Tylenol or other OTC NSAID's as needed for her osteoarthritis.  She is given triamcinolone cream to use topically twice a day on the rash which is located on her back.    PHQ-2/PHQ-9 Depression Screening 8/9/2019   Little interest or pleasure in doing things 0   Feeling down, depressed, or hopeless 0   Trouble falling or staying asleep, or sleeping too much -   Feeling tired or having little energy -   Poor appetite or overeating -   Feeling bad about yourself - or that you are a failure or have let yourself or your family down -   Trouble concentrating on things, such as reading the newspaper or watching television -   Moving or speaking so slowly that other people could have noticed. Or the opposite - being so fidgety or restless that you have been moving around a lot more than usual -   Thoughts that you would be better off dead, or of hurting yourself in some way -   Total Score 0   If you checked off any problems, how difficult have these problems made it for you to do your work, take care of things at home,  or get along with other people? -         Lab on 08/06/2019   Component Date Value Ref Range Status   • Glucose 08/06/2019 94  70 - 99 mg/dL Final   • BUN 08/06/2019 25* 7 - 23 mg/dL Final   • Creatinine 08/06/2019 1.23* 0.52 - 1.04 mg/dL Final   • Sodium 08/06/2019 145  137 - 145 mmol/L Final   • Potassium 08/06/2019 5.1* 3.4 - 5.0 mmol/L Final   • Chloride 08/06/2019 107  101 - 112 mmol/L Final   • CO2 08/06/2019 30.0  22.0 - 30.0 mmol/L Final   • Calcium 08/06/2019 9.5  8.4 - 10.2 mg/dL Final   • Total Protein 08/06/2019 7.0  6.3 - 8.6 g/dL Final   • Albumin 08/06/2019 4.30  3.50 - 5.00 g/dL Final   • ALT (SGPT) 08/06/2019 18  <=35 U/L Final   • AST (SGOT) 08/06/2019 35  14 - 36 U/L Final   • Alkaline Phosphatase 08/06/2019 77  38 - 126 U/L Final   • Total Bilirubin 08/06/2019 0.2  0.2 - 1.3 mg/dL Final   • eGFR Non  Amer 08/06/2019 41  39 - 90 mL/min/1.73 Final   • Globulin 08/06/2019 2.7  2.3 - 3.5 gm/dL Final   • A/G Ratio 08/06/2019 1.6  1.1 - 1.8 g/dL Final   • BUN/Creatinine Ratio 08/06/2019 20.3  7.0 - 25.0 Final   • Anion Gap 08/06/2019 8.0  5.0 - 15.0 mmol/L Final   • Free T4 08/06/2019 1.25  0.93 - 1.70 ng/dL Final   • TSH 08/06/2019 3.310  0.270 - 4.200 mIU/mL Final   • Color, UA 08/06/2019 Yellow  Yellow, Straw Final   • Appearance, UA 08/06/2019 Cloudy* Clear Final   • pH, UA 08/06/2019 7.0  5.5 - 8.0 Final   • Specific Gravity, UA 08/06/2019 1.015  1.005 - 1.030 Final   • Glucose, UA 08/06/2019 Negative  Negative Final   • Ketones, UA 08/06/2019 Negative  Negative Final   • Bilirubin, UA 08/06/2019 Negative  Negative Final   • Blood, UA 08/06/2019 Trace* Negative Final   • Protein, UA 08/06/2019 Negative  Negative Final   • Leuk Esterase, UA 08/06/2019 Moderate (2+)* Negative Final   • Nitrite, UA 08/06/2019 Negative  Negative Final   • Urobilinogen, UA 08/06/2019 0.2 E.U./dL  0.2 - 1.0 E.U./dL Final   • LDL Cholesterol  08/06/2019 60  0 - 100 mg/dL Final   • WBC 08/06/2019 8.35  3.40 - 10.80  10*3/mm3 Final   • RBC 08/06/2019 3.58* 3.77 - 5.28 10*6/mm3 Final   • Hemoglobin 08/06/2019 11.1* 12.0 - 15.9 g/dL Final   • Hematocrit 08/06/2019 34.1  34.0 - 46.6 % Final   • MCV 08/06/2019 95.3  79.0 - 97.0 fL Final   • MCH 08/06/2019 31.0  26.6 - 33.0 pg Final   • MCHC 08/06/2019 32.6  31.5 - 35.7 g/dL Final   • RDW 08/06/2019 13.6  12.3 - 15.4 % Final   • RDW-SD 08/06/2019 45.4  37.0 - 54.0 fl Final   • MPV 08/06/2019 10.6  6.0 - 12.0 fL Final   • Platelets 08/06/2019 159  140 - 450 10*3/mm3 Final   • Neutrophil % 08/06/2019 54.0  42.7 - 76.0 % Final   • Lymphocyte % 08/06/2019 32.5  19.6 - 45.3 % Final   • Monocyte % 08/06/2019 7.1  5.0 - 12.0 % Final   • Eosinophil % 08/06/2019 6.0  0.3 - 6.2 % Final   • Basophil % 08/06/2019 0.4  0.0 - 1.5 % Final   • Neutrophils, Absolute 08/06/2019 4.52  1.70 - 7.00 10*3/mm3 Final   • Lymphocytes, Absolute 08/06/2019 2.71  0.70 - 3.10 10*3/mm3 Final   • Monocytes, Absolute 08/06/2019 0.59  0.10 - 0.90 10*3/mm3 Final   • Eosinophils, Absolute 08/06/2019 0.50* 0.00 - 0.40 10*3/mm3 Final   • Basophils, Absolute 08/06/2019 0.03  0.00 - 0.20 10*3/mm3 Final   • RBC, UA 08/06/2019 0-2* None Seen /HPF Final   • WBC, UA 08/06/2019 21-30* None Seen /HPF Final   • Bacteria, UA 08/06/2019 3+* None Seen /HPF Final   • Squamous Epithelial Cells, UA 08/06/2019 0-2  None Seen, 0-2 /HPF Final   • Hyaline Casts, UA 08/06/2019 None Seen  None Seen /LPF Final   • Methodology 08/06/2019 Manual Light Microscopy   Final   • Urine Culture 08/06/2019 No growth   Final   ]        .

## 2019-08-09 NOTE — PATIENT INSTRUCTIONS

## 2019-08-30 DIAGNOSIS — E78.5 HYPERLIPIDEMIA, UNSPECIFIED HYPERLIPIDEMIA TYPE: ICD-10-CM

## 2019-08-30 RX ORDER — ROSUVASTATIN CALCIUM 5 MG/1
TABLET, COATED ORAL
Qty: 90 TABLET | Refills: 1 | Status: SHIPPED | OUTPATIENT
Start: 2019-08-30 | End: 2020-01-07

## 2019-11-04 DIAGNOSIS — K21.9 GASTROESOPHAGEAL REFLUX DISEASE WITHOUT ESOPHAGITIS: ICD-10-CM

## 2019-11-04 RX ORDER — CHLORDIAZEPOXIDE HYDROCHLORIDE AND CLIDINIUM BROMIDE 5; 2.5 MG/1; MG/1
CAPSULE ORAL
Qty: 60 CAPSULE | Refills: 5 | OUTPATIENT
Start: 2019-11-04

## 2019-12-05 DIAGNOSIS — K21.9 GASTROESOPHAGEAL REFLUX DISEASE WITHOUT ESOPHAGITIS: ICD-10-CM

## 2019-12-05 RX ORDER — CHLORDIAZEPOXIDE HYDROCHLORIDE AND CLIDINIUM BROMIDE 5; 2.5 MG/1; MG/1
CAPSULE ORAL
Qty: 60 CAPSULE | Refills: 2 | Status: SHIPPED | OUTPATIENT
Start: 2019-12-05 | End: 2020-02-06

## 2020-01-07 DIAGNOSIS — E78.5 HYPERLIPIDEMIA, UNSPECIFIED HYPERLIPIDEMIA TYPE: ICD-10-CM

## 2020-01-07 RX ORDER — ROSUVASTATIN CALCIUM 5 MG/1
TABLET, COATED ORAL
Qty: 90 TABLET | Refills: 1 | Status: SHIPPED | OUTPATIENT
Start: 2020-01-07 | End: 2020-03-06

## 2020-01-31 DIAGNOSIS — I10 ESSENTIAL HYPERTENSION: Chronic | ICD-10-CM

## 2020-02-03 ENCOUNTER — LAB (OUTPATIENT)
Dept: LAB | Facility: OTHER | Age: 85
End: 2020-02-03

## 2020-02-03 DIAGNOSIS — E78.2 MIXED HYPERLIPIDEMIA: Chronic | ICD-10-CM

## 2020-02-03 DIAGNOSIS — I10 ESSENTIAL HYPERTENSION: ICD-10-CM

## 2020-02-03 DIAGNOSIS — D50.8 IRON DEFICIENCY ANEMIA SECONDARY TO INADEQUATE DIETARY IRON INTAKE: ICD-10-CM

## 2020-02-03 LAB
ALBUMIN SERPL-MCNC: 4 G/DL (ref 3.5–5)
ALBUMIN/GLOB SERPL: 1.3 G/DL (ref 1.1–1.8)
ALP SERPL-CCNC: 74 U/L (ref 38–126)
ALT SERPL W P-5'-P-CCNC: 13 U/L
ANION GAP SERPL CALCULATED.3IONS-SCNC: 5 MMOL/L (ref 5–15)
AST SERPL-CCNC: 27 U/L (ref 14–36)
BACTERIA UR QL AUTO: ABNORMAL /HPF
BASOPHILS # BLD AUTO: 0.05 10*3/MM3 (ref 0–0.2)
BASOPHILS NFR BLD AUTO: 0.7 % (ref 0–1.5)
BILIRUB SERPL-MCNC: 0.4 MG/DL (ref 0.2–1.3)
BILIRUB UR QL STRIP: NEGATIVE
BUN BLD-MCNC: 23 MG/DL (ref 7–23)
BUN/CREAT SERPL: 19.5 (ref 7–25)
CALCIUM SPEC-SCNC: 9.5 MG/DL (ref 8.4–10.2)
CHLORIDE SERPL-SCNC: 106 MMOL/L (ref 101–112)
CHOLEST SERPL-MCNC: 119 MG/DL (ref 150–200)
CLARITY UR: ABNORMAL
CO2 SERPL-SCNC: 29 MMOL/L (ref 22–30)
COLOR UR: YELLOW
CREAT BLD-MCNC: 1.18 MG/DL (ref 0.52–1.04)
DEPRECATED RDW RBC AUTO: 45.9 FL (ref 37–54)
EOSINOPHIL # BLD AUTO: 0.48 10*3/MM3 (ref 0–0.4)
EOSINOPHIL NFR BLD AUTO: 6.6 % (ref 0.3–6.2)
ERYTHROCYTE [DISTWIDTH] IN BLOOD BY AUTOMATED COUNT: 14.1 % (ref 12.3–15.4)
GFR SERPL CREATININE-BSD FRML MDRD: 43 ML/MIN/1.73 (ref 39–90)
GLOBULIN UR ELPH-MCNC: 3.1 GM/DL (ref 2.3–3.5)
GLUCOSE BLD-MCNC: 96 MG/DL (ref 70–99)
GLUCOSE UR STRIP-MCNC: NEGATIVE MG/DL
HCT VFR BLD AUTO: 33.2 % (ref 34–46.6)
HDLC SERPL-MCNC: 50 MG/DL (ref 40–59)
HGB BLD-MCNC: 10.6 G/DL (ref 12–15.9)
HGB UR QL STRIP.AUTO: ABNORMAL
HYALINE CASTS UR QL AUTO: ABNORMAL /LPF
KETONES UR QL STRIP: NEGATIVE
LDLC SERPL CALC-MCNC: 46 MG/DL
LDLC/HDLC SERPL: 0.92 {RATIO} (ref 0–3.22)
LEUKOCYTE ESTERASE UR QL STRIP.AUTO: ABNORMAL
LYMPHOCYTES # BLD AUTO: 2.14 10*3/MM3 (ref 0.7–3.1)
LYMPHOCYTES NFR BLD AUTO: 29.6 % (ref 19.6–45.3)
MCH RBC QN AUTO: 29.4 PG (ref 26.6–33)
MCHC RBC AUTO-ENTMCNC: 31.9 G/DL (ref 31.5–35.7)
MCV RBC AUTO: 92.2 FL (ref 79–97)
MONOCYTES # BLD AUTO: 0.56 10*3/MM3 (ref 0.1–0.9)
MONOCYTES NFR BLD AUTO: 7.7 % (ref 5–12)
NEUTROPHILS # BLD AUTO: 4 10*3/MM3 (ref 1.7–7)
NEUTROPHILS NFR BLD AUTO: 55.4 % (ref 42.7–76)
NITRITE UR QL STRIP: NEGATIVE
PH UR STRIP.AUTO: 7 [PH] (ref 5.5–8)
PLATELET # BLD AUTO: 151 10*3/MM3 (ref 140–450)
PMV BLD AUTO: 11.3 FL (ref 6–12)
POTASSIUM BLD-SCNC: 4.9 MMOL/L (ref 3.4–5)
PROT SERPL-MCNC: 7.1 G/DL (ref 6.3–8.6)
PROT UR QL STRIP: ABNORMAL
RBC # BLD AUTO: 3.6 10*6/MM3 (ref 3.77–5.28)
RBC # UR: ABNORMAL /HPF
REF LAB TEST METHOD: ABNORMAL
SODIUM BLD-SCNC: 140 MMOL/L (ref 137–145)
SP GR UR STRIP: 1.02 (ref 1–1.03)
SQUAMOUS #/AREA URNS HPF: ABNORMAL /HPF
TRIGL SERPL-MCNC: 116 MG/DL
UROBILINOGEN UR QL STRIP: ABNORMAL
VLDLC SERPL-MCNC: 23.2 MG/DL
WBC NRBC COR # BLD: 7.23 10*3/MM3 (ref 3.4–10.8)
WBC UR QL AUTO: ABNORMAL /HPF

## 2020-02-03 PROCEDURE — 81001 URINALYSIS AUTO W/SCOPE: CPT | Performed by: INTERNAL MEDICINE

## 2020-02-03 PROCEDURE — 80053 COMPREHEN METABOLIC PANEL: CPT | Performed by: INTERNAL MEDICINE

## 2020-02-03 PROCEDURE — 84439 ASSAY OF FREE THYROXINE: CPT | Performed by: INTERNAL MEDICINE

## 2020-02-03 PROCEDURE — 84443 ASSAY THYROID STIM HORMONE: CPT | Performed by: INTERNAL MEDICINE

## 2020-02-03 PROCEDURE — 80061 LIPID PANEL: CPT | Performed by: INTERNAL MEDICINE

## 2020-02-03 PROCEDURE — 36415 COLL VENOUS BLD VENIPUNCTURE: CPT | Performed by: INTERNAL MEDICINE

## 2020-02-03 PROCEDURE — 87086 URINE CULTURE/COLONY COUNT: CPT | Performed by: INTERNAL MEDICINE

## 2020-02-03 PROCEDURE — 85025 COMPLETE CBC W/AUTO DIFF WBC: CPT | Performed by: INTERNAL MEDICINE

## 2020-02-04 LAB
T4 FREE SERPL-MCNC: 1.18 NG/DL (ref 0.93–1.7)
TSH SERPL DL<=0.05 MIU/L-ACNC: 3.1 UIU/ML (ref 0.27–4.2)

## 2020-02-04 RX ORDER — LISINOPRIL 10 MG/1
10 TABLET ORAL DAILY
Qty: 90 TABLET | Refills: 0 | Status: SHIPPED | OUTPATIENT
Start: 2020-02-04 | End: 2020-07-30

## 2020-02-05 LAB — BACTERIA SPEC AEROBE CULT: ABNORMAL

## 2020-02-06 DIAGNOSIS — K21.9 GASTROESOPHAGEAL REFLUX DISEASE WITHOUT ESOPHAGITIS: ICD-10-CM

## 2020-02-06 RX ORDER — CHLORDIAZEPOXIDE HYDROCHLORIDE AND CLIDINIUM BROMIDE 5; 2.5 MG/1; MG/1
CAPSULE ORAL
Qty: 60 CAPSULE | Refills: 0 | Status: SHIPPED | OUTPATIENT
Start: 2020-02-06 | End: 2020-04-09

## 2020-02-21 ENCOUNTER — OFFICE VISIT (OUTPATIENT)
Dept: FAMILY MEDICINE CLINIC | Facility: CLINIC | Age: 85
End: 2020-02-21

## 2020-02-21 VITALS
HEART RATE: 60 BPM | DIASTOLIC BLOOD PRESSURE: 64 MMHG | WEIGHT: 153 LBS | HEIGHT: 61 IN | BODY MASS INDEX: 28.89 KG/M2 | SYSTOLIC BLOOD PRESSURE: 122 MMHG | TEMPERATURE: 98.6 F | OXYGEN SATURATION: 98 %

## 2020-02-21 DIAGNOSIS — N39.0 URINARY TRACT INFECTION WITHOUT HEMATURIA, SITE UNSPECIFIED: ICD-10-CM

## 2020-02-21 DIAGNOSIS — D50.8 IRON DEFICIENCY ANEMIA SECONDARY TO INADEQUATE DIETARY IRON INTAKE: Chronic | ICD-10-CM

## 2020-02-21 DIAGNOSIS — I10 ESSENTIAL HYPERTENSION: Primary | Chronic | ICD-10-CM

## 2020-02-21 DIAGNOSIS — K21.9 GASTROESOPHAGEAL REFLUX DISEASE WITHOUT ESOPHAGITIS: Chronic | ICD-10-CM

## 2020-02-21 DIAGNOSIS — E78.2 MIXED HYPERLIPIDEMIA: Chronic | ICD-10-CM

## 2020-02-21 DIAGNOSIS — F41.1 GENERALIZED ANXIETY DISORDER: Chronic | ICD-10-CM

## 2020-02-21 DIAGNOSIS — M15.9 PRIMARY OSTEOARTHRITIS INVOLVING MULTIPLE JOINTS: Chronic | ICD-10-CM

## 2020-02-21 PROCEDURE — 99214 OFFICE O/P EST MOD 30 MIN: CPT | Performed by: INTERNAL MEDICINE

## 2020-02-21 RX ORDER — CLINDAMYCIN HYDROCHLORIDE 300 MG/1
300 CAPSULE ORAL 3 TIMES DAILY
Qty: 21 CAPSULE | Refills: 0 | Status: SHIPPED | OUTPATIENT
Start: 2020-02-21 | End: 2020-03-06

## 2020-02-21 NOTE — PATIENT INSTRUCTIONS

## 2020-02-21 NOTE — PROGRESS NOTES
"Chief Complaint   Patient presents with   • Hyperlipidemia     6 month f/u on labs    • Hypertension     Subjective   Emily Valdez is a 89 y.o. female who presents to the office for follow-up and review of labs. She has hypertension and her blood pressure has been controlled. She has hyperlipidemia and takes Crestor daily. She has GERD, and uses Tums as needed. She has macular degeneration and sees ophthalmology on a regular basis. She has iron deficiency anemia, and takes a daily multivitamin with iron. She has osteoarthritis and takes over-the-counter NSAIDs as needed.  She has anxiety which has been doing well.    The following portions of the patient's history were reviewed and updated as appropriate: allergies, current medications, past family history, past medical history, past social history, past surgical history and problem list.    Review of Systems   Constitutional: Negative for chills, fatigue and fever.   HENT: Negative for congestion, sneezing, sore throat and trouble swallowing.    Eyes: Negative for visual disturbance.   Respiratory: Negative for cough, chest tightness, shortness of breath and wheezing.    Cardiovascular: Negative for chest pain, palpitations and leg swelling.   Gastrointestinal: Negative for constipation, diarrhea, nausea and vomiting.   Genitourinary: Negative for dysuria, frequency and urgency.   Musculoskeletal: Negative for neck pain.   Neurological: Negative for weakness and headaches.   Psychiatric/Behavioral:        Patient denies any feelings of depression and has not felt down, hopeless or lost interest in any activities.       Objective   Vitals:    02/21/20 1336   BP: 122/64   Pulse: 60   Temp: 98.6 °F (37 °C)   TempSrc: Oral   SpO2: 98%   Weight: 69.4 kg (153 lb)   Height: 154.9 cm (61\")   PainSc: 0-No pain      Body mass index is 28.91 kg/m².    Physical Exam   Constitutional: She is oriented to person, place, and time. She appears well-developed and " well-nourished.   HENT:   Head: Normocephalic and atraumatic.   Nose: Nose normal.   Mouth/Throat: Oropharynx is clear and moist. No oropharyngeal exudate.   Eyes: Pupils are equal, round, and reactive to light. Conjunctivae and EOM are normal. Right eye exhibits no discharge. Left eye exhibits no discharge. No scleral icterus.   She is wearing dark sunglasses secondary to her macular degeneration and glaucoma   Neck: Normal range of motion. Neck supple. No thyromegaly present.   Cardiovascular: Normal rate, regular rhythm, normal heart sounds and intact distal pulses. Exam reveals no gallop and no friction rub.   No murmur heard.  Pulmonary/Chest: Effort normal and breath sounds normal. No respiratory distress. She has no wheezes. She has no rales.   Abdominal: Soft. Bowel sounds are normal. She exhibits no distension. There is no tenderness. There is no rebound and no guarding.   Musculoskeletal: She exhibits no edema.   Lymphadenopathy:     She has no cervical adenopathy.   Neurological: She is alert and oriented to person, place, and time. No cranial nerve deficit. Gait abnormal.   Patient walks with the aid of a walker   Skin: Skin is warm and dry.   Psychiatric: She has a normal mood and affect. Her behavior is normal. Judgment and thought content normal.   Nursing note and vitals reviewed.      Assessment/Plan   Emily was seen today for hyperlipidemia and hypertension.    Diagnoses and all orders for this visit:    Essential hypertension  -     CBC & Differential; Future  -     Comprehensive Metabolic Panel; Future  -     T4, Free; Future  -     TSH; Future    Mixed hyperlipidemia  -     LDL Cholesterol, Direct; Future    Gastroesophageal reflux disease without esophagitis    Primary osteoarthritis involving multiple joints    Iron deficiency anemia secondary to inadequate dietary iron intake    Generalized anxiety disorder    Urinary tract infection without hematuria, site unspecified  -     clindamycin  (CLEOCIN) 300 MG capsule; Take 1 capsule by mouth 3 (Three) Times a Day.         Labs are reviewed with patient.  Her anemia is stable. Her hemoglobin is 10.6. Her lipids are at goal, and she will continue with Crestor.  Her blood pressure is controlled, and she will continue with her current blood pressure medication.  She will continue with Phenergan and Librax as needed for her GI symptoms.   She will continue with Tylenol or other OTC NSAID's as needed for her osteoarthritis.  Her urine culture is positive for bacillus species.  She is given clindamycin for treatment.    ACP discussion was held with the patient during this visit. Patient has an advance directive, copy requested.      PHQ-2/PHQ-9 Depression Screening 8/9/2019   Little interest or pleasure in doing things 0   Feeling down, depressed, or hopeless 0   Trouble falling or staying asleep, or sleeping too much -   Feeling tired or having little energy -   Poor appetite or overeating -   Feeling bad about yourself - or that you are a failure or have let yourself or your family down -   Trouble concentrating on things, such as reading the newspaper or watching television -   Moving or speaking so slowly that other people could have noticed. Or the opposite - being so fidgety or restless that you have been moving around a lot more than usual -   Thoughts that you would be better off dead, or of hurting yourself in some way -   Total Score 0   If you checked off any problems, how difficult have these problems made it for you to do your work, take care of things at home, or get along with other people? -         Lab on 02/03/2020   Component Date Value Ref Range Status   • Glucose 02/03/2020 96  70 - 99 mg/dL Final   • BUN 02/03/2020 23  7 - 23 mg/dL Final   • Creatinine 02/03/2020 1.18* 0.52 - 1.04 mg/dL Final   • Sodium 02/03/2020 140  137 - 145 mmol/L Final   • Potassium 02/03/2020 4.9  3.4 - 5.0 mmol/L Final   • Chloride 02/03/2020 106  101 - 112 mmol/L  Final   • CO2 02/03/2020 29.0  22.0 - 30.0 mmol/L Final   • Calcium 02/03/2020 9.5  8.4 - 10.2 mg/dL Final   • Total Protein 02/03/2020 7.1  6.3 - 8.6 g/dL Final   • Albumin 02/03/2020 4.00  3.50 - 5.00 g/dL Final   • ALT (SGPT) 02/03/2020 13  <=35 U/L Final   • AST (SGOT) 02/03/2020 27  14 - 36 U/L Final   • Alkaline Phosphatase 02/03/2020 74  38 - 126 U/L Final   • Total Bilirubin 02/03/2020 0.4  0.2 - 1.3 mg/dL Final   • eGFR Non African Amer 02/03/2020 43  39 - 90 mL/min/1.73 Final   • Globulin 02/03/2020 3.1  2.3 - 3.5 gm/dL Final   • A/G Ratio 02/03/2020 1.3  1.1 - 1.8 g/dL Final   • BUN/Creatinine Ratio 02/03/2020 19.5  7.0 - 25.0 Final   • Anion Gap 02/03/2020 5.0  5.0 - 15.0 mmol/L Final   • Free T4 02/03/2020 1.18  0.93 - 1.70 ng/dL Final   • TSH 02/03/2020 3.100  0.270 - 4.200 uIU/mL Final   • Color, UA 02/03/2020 Yellow  Yellow, Straw Final   • Appearance, UA 02/03/2020 Cloudy* Clear Final   • pH, UA 02/03/2020 7.0  5.5 - 8.0 Final   • Specific Gravity, UA 02/03/2020 1.025  1.005 - 1.030 Final   • Glucose, UA 02/03/2020 Negative  Negative Final   • Ketones, UA 02/03/2020 Negative  Negative Final   • Bilirubin, UA 02/03/2020 Negative  Negative Final   • Blood, UA 02/03/2020 Trace* Negative Final   • Protein, UA 02/03/2020 30 mg/dL (1+)* Negative Final   • Leuk Esterase, UA 02/03/2020 Small (1+)* Negative Final   • Nitrite, UA 02/03/2020 Negative  Negative Final   • Urobilinogen, UA 02/03/2020 0.2 E.U./dL  0.2 - 1.0 E.U./dL Final   • Total Cholesterol 02/03/2020 119* 150 - 200 mg/dL Final   • Triglycerides 02/03/2020 116  <=150 mg/dL Final   • HDL Cholesterol 02/03/2020 50  40 - 59 mg/dL Final   • LDL Cholesterol  02/03/2020 46  <=100 mg/dL Final   • VLDL Cholesterol 02/03/2020 23.2  mg/dL Final   • LDL/HDL Ratio 02/03/2020 0.92  0.00 - 3.22 Final   • WBC 02/03/2020 7.23  3.40 - 10.80 10*3/mm3 Final   • RBC 02/03/2020 3.60* 3.77 - 5.28 10*6/mm3 Final   • Hemoglobin 02/03/2020 10.6* 12.0 - 15.9 g/dL Final    • Hematocrit 02/03/2020 33.2* 34.0 - 46.6 % Final   • MCV 02/03/2020 92.2  79.0 - 97.0 fL Final   • MCH 02/03/2020 29.4  26.6 - 33.0 pg Final   • MCHC 02/03/2020 31.9  31.5 - 35.7 g/dL Final   • RDW 02/03/2020 14.1  12.3 - 15.4 % Final   • RDW-SD 02/03/2020 45.9  37.0 - 54.0 fl Final   • MPV 02/03/2020 11.3  6.0 - 12.0 fL Final   • Platelets 02/03/2020 151  140 - 450 10*3/mm3 Final   • Neutrophil % 02/03/2020 55.4  42.7 - 76.0 % Final   • Lymphocyte % 02/03/2020 29.6  19.6 - 45.3 % Final   • Monocyte % 02/03/2020 7.7  5.0 - 12.0 % Final   • Eosinophil % 02/03/2020 6.6* 0.3 - 6.2 % Final   • Basophil % 02/03/2020 0.7  0.0 - 1.5 % Final   • Neutrophils, Absolute 02/03/2020 4.00  1.70 - 7.00 10*3/mm3 Final   • Lymphocytes, Absolute 02/03/2020 2.14  0.70 - 3.10 10*3/mm3 Final   • Monocytes, Absolute 02/03/2020 0.56  0.10 - 0.90 10*3/mm3 Final   • Eosinophils, Absolute 02/03/2020 0.48* 0.00 - 0.40 10*3/mm3 Final   • Basophils, Absolute 02/03/2020 0.05  0.00 - 0.20 10*3/mm3 Final   • RBC, UA 02/03/2020 0-2* None Seen /HPF Final   • WBC, UA 02/03/2020 13-20* None Seen /HPF Final   • Bacteria, UA 02/03/2020 Trace* None Seen /HPF Final   • Squamous Epithelial Cells, UA 02/03/2020 3-6* None Seen, 0-2 /HPF Final   • Hyaline Casts, UA 02/03/2020 None Seen  None Seen /LPF Final   • Methodology 02/03/2020 Manual Light Microscopy   Final   • Urine Culture 02/03/2020 >100,000 CFU/mL Bacillus species*  Final      Most Bacillus species are susceptible to vancomycin, aminoglycosides, clindamycin, chloramphenicol, and erythromycin.   ]        .

## 2020-03-06 DIAGNOSIS — N39.0 URINARY TRACT INFECTION WITHOUT HEMATURIA, SITE UNSPECIFIED: ICD-10-CM

## 2020-03-06 DIAGNOSIS — E78.5 HYPERLIPIDEMIA, UNSPECIFIED HYPERLIPIDEMIA TYPE: ICD-10-CM

## 2020-03-06 RX ORDER — ROSUVASTATIN CALCIUM 5 MG/1
TABLET, COATED ORAL
Qty: 90 TABLET | Refills: 1 | Status: SHIPPED | OUTPATIENT
Start: 2020-03-06 | End: 2020-09-08

## 2020-03-06 RX ORDER — CLINDAMYCIN HYDROCHLORIDE 300 MG/1
300 CAPSULE ORAL 3 TIMES DAILY
Qty: 21 CAPSULE | Refills: 0 | Status: SHIPPED | OUTPATIENT
Start: 2020-03-06 | End: 2020-08-21

## 2020-04-08 DIAGNOSIS — K21.9 GASTROESOPHAGEAL REFLUX DISEASE WITHOUT ESOPHAGITIS: ICD-10-CM

## 2020-04-09 RX ORDER — CHLORDIAZEPOXIDE HYDROCHLORIDE AND CLIDINIUM BROMIDE 5; 2.5 MG/1; MG/1
CAPSULE ORAL
Qty: 60 CAPSULE | Refills: 5 | Status: SHIPPED | OUTPATIENT
Start: 2020-04-09 | End: 2020-09-10

## 2020-04-09 RX ORDER — PROMETHAZINE HYDROCHLORIDE 25 MG/1
TABLET ORAL
Qty: 30 TABLET | Refills: 1 | Status: SHIPPED | OUTPATIENT
Start: 2020-04-09

## 2020-04-09 NOTE — TELEPHONE ENCOUNTER
----- Message from Magnolia Murray sent at 4/9/2020 10:58 AM CDT -----  Regarding: med refill  Contact: [phone]   Needs refill on promethazine (PHENERGAN) 25 MG, to [facility].

## 2020-07-30 DIAGNOSIS — I10 ESSENTIAL HYPERTENSION: Chronic | ICD-10-CM

## 2020-07-30 RX ORDER — LISINOPRIL 10 MG/1
10 TABLET ORAL DAILY
Qty: 90 TABLET | Refills: 0 | Status: SHIPPED | OUTPATIENT
Start: 2020-07-30 | End: 2020-11-05

## 2020-08-11 ENCOUNTER — LAB (OUTPATIENT)
Dept: LAB | Facility: OTHER | Age: 85
End: 2020-08-11

## 2020-08-11 DIAGNOSIS — I10 ESSENTIAL HYPERTENSION: ICD-10-CM

## 2020-08-11 DIAGNOSIS — E78.2 MIXED HYPERLIPIDEMIA: Chronic | ICD-10-CM

## 2020-08-11 LAB
ALBUMIN SERPL-MCNC: 3.7 G/DL (ref 3.5–5)
ALBUMIN/GLOB SERPL: 1.1 G/DL (ref 1.1–1.8)
ALP SERPL-CCNC: 78 U/L (ref 38–126)
ALT SERPL W P-5'-P-CCNC: 11 U/L
ANION GAP SERPL CALCULATED.3IONS-SCNC: 6 MMOL/L (ref 5–15)
AST SERPL-CCNC: 30 U/L (ref 14–36)
BASOPHILS # BLD AUTO: 0.04 10*3/MM3 (ref 0–0.2)
BASOPHILS NFR BLD AUTO: 0.7 % (ref 0–1.5)
BILIRUB SERPL-MCNC: 0.3 MG/DL (ref 0.2–1.3)
BUN SERPL-MCNC: 28 MG/DL (ref 7–23)
BUN/CREAT SERPL: 20 (ref 7–25)
CALCIUM SPEC-SCNC: 9.2 MG/DL (ref 8.4–10.2)
CHLORIDE SERPL-SCNC: 103 MMOL/L (ref 101–112)
CO2 SERPL-SCNC: 29 MMOL/L (ref 22–30)
CREAT SERPL-MCNC: 1.4 MG/DL (ref 0.52–1.04)
DEPRECATED RDW RBC AUTO: 49.1 FL (ref 37–54)
EOSINOPHIL # BLD AUTO: 0.46 10*3/MM3 (ref 0–0.4)
EOSINOPHIL NFR BLD AUTO: 8.2 % (ref 0.3–6.2)
ERYTHROCYTE [DISTWIDTH] IN BLOOD BY AUTOMATED COUNT: 14.9 % (ref 12.3–15.4)
GFR SERPL CREATININE-BSD FRML MDRD: 35 ML/MIN/1.73 (ref 39–90)
GLOBULIN UR ELPH-MCNC: 3.4 GM/DL (ref 2.3–3.5)
GLUCOSE SERPL-MCNC: 95 MG/DL (ref 70–99)
HCT VFR BLD AUTO: 29.2 % (ref 34–46.6)
HGB BLD-MCNC: 9.1 G/DL (ref 12–15.9)
LYMPHOCYTES # BLD AUTO: 1.69 10*3/MM3 (ref 0.7–3.1)
LYMPHOCYTES NFR BLD AUTO: 30 % (ref 19.6–45.3)
MCH RBC QN AUTO: 29.2 PG (ref 26.6–33)
MCHC RBC AUTO-ENTMCNC: 31.2 G/DL (ref 31.5–35.7)
MCV RBC AUTO: 93.6 FL (ref 79–97)
MONOCYTES # BLD AUTO: 0.6 10*3/MM3 (ref 0.1–0.9)
MONOCYTES NFR BLD AUTO: 10.7 % (ref 5–12)
NEUTROPHILS NFR BLD AUTO: 2.84 10*3/MM3 (ref 1.7–7)
NEUTROPHILS NFR BLD AUTO: 50.4 % (ref 42.7–76)
PLATELET # BLD AUTO: 132 10*3/MM3 (ref 140–450)
PMV BLD AUTO: 11.3 FL (ref 6–12)
POTASSIUM SERPL-SCNC: 5.4 MMOL/L (ref 3.4–5)
PROT SERPL-MCNC: 7.1 G/DL (ref 6.3–8.6)
RBC # BLD AUTO: 3.12 10*6/MM3 (ref 3.77–5.28)
SODIUM SERPL-SCNC: 138 MMOL/L (ref 137–145)
WBC # BLD AUTO: 5.63 10*3/MM3 (ref 3.4–10.8)

## 2020-08-11 PROCEDURE — 36415 COLL VENOUS BLD VENIPUNCTURE: CPT | Performed by: INTERNAL MEDICINE

## 2020-08-11 PROCEDURE — 84439 ASSAY OF FREE THYROXINE: CPT | Performed by: INTERNAL MEDICINE

## 2020-08-11 PROCEDURE — 85025 COMPLETE CBC W/AUTO DIFF WBC: CPT | Performed by: INTERNAL MEDICINE

## 2020-08-11 PROCEDURE — 80053 COMPREHEN METABOLIC PANEL: CPT | Performed by: INTERNAL MEDICINE

## 2020-08-11 PROCEDURE — 83721 ASSAY OF BLOOD LIPOPROTEIN: CPT | Performed by: INTERNAL MEDICINE

## 2020-08-11 PROCEDURE — 84443 ASSAY THYROID STIM HORMONE: CPT | Performed by: INTERNAL MEDICINE

## 2020-08-12 LAB
ARTICHOKE IGE QN: 37 MG/DL (ref 0–100)
T4 FREE SERPL-MCNC: 1.47 NG/DL (ref 0.93–1.7)
TSH SERPL DL<=0.05 MIU/L-ACNC: 2.65 UIU/ML (ref 0.27–4.2)

## 2020-08-21 ENCOUNTER — OFFICE VISIT (OUTPATIENT)
Dept: FAMILY MEDICINE CLINIC | Facility: CLINIC | Age: 85
End: 2020-08-21

## 2020-08-21 VITALS
TEMPERATURE: 98.6 F | WEIGHT: 153 LBS | OXYGEN SATURATION: 99 % | BODY MASS INDEX: 27.11 KG/M2 | SYSTOLIC BLOOD PRESSURE: 118 MMHG | HEIGHT: 63 IN | HEART RATE: 70 BPM | DIASTOLIC BLOOD PRESSURE: 68 MMHG

## 2020-08-21 DIAGNOSIS — K21.9 GASTROESOPHAGEAL REFLUX DISEASE WITHOUT ESOPHAGITIS: Chronic | ICD-10-CM

## 2020-08-21 DIAGNOSIS — D50.8 IRON DEFICIENCY ANEMIA SECONDARY TO INADEQUATE DIETARY IRON INTAKE: Chronic | ICD-10-CM

## 2020-08-21 DIAGNOSIS — M15.9 PRIMARY OSTEOARTHRITIS INVOLVING MULTIPLE JOINTS: Chronic | ICD-10-CM

## 2020-08-21 DIAGNOSIS — N18.30 CHRONIC RENAL IMPAIRMENT, STAGE 3 (MODERATE) (HCC): Chronic | ICD-10-CM

## 2020-08-21 DIAGNOSIS — F41.1 GENERALIZED ANXIETY DISORDER: Chronic | ICD-10-CM

## 2020-08-21 DIAGNOSIS — Z00.00 MEDICARE ANNUAL WELLNESS VISIT, SUBSEQUENT: Primary | ICD-10-CM

## 2020-08-21 DIAGNOSIS — I10 ESSENTIAL HYPERTENSION: Chronic | ICD-10-CM

## 2020-08-21 DIAGNOSIS — R53.1 WEAKNESS: ICD-10-CM

## 2020-08-21 DIAGNOSIS — R21 RASH: ICD-10-CM

## 2020-08-21 DIAGNOSIS — E78.2 MIXED HYPERLIPIDEMIA: Chronic | ICD-10-CM

## 2020-08-21 PROCEDURE — G0439 PPPS, SUBSEQ VISIT: HCPCS | Performed by: INTERNAL MEDICINE

## 2020-08-21 PROCEDURE — 99214 OFFICE O/P EST MOD 30 MIN: CPT | Performed by: INTERNAL MEDICINE

## 2020-08-21 RX ORDER — TRIAMCINOLONE ACETONIDE 1 MG/G
CREAM TOPICAL 2 TIMES DAILY
Qty: 80 G | Refills: 0 | Status: SHIPPED | OUTPATIENT
Start: 2020-08-21

## 2020-08-21 RX ORDER — LANOLIN ALCOHOL/MO/W.PET/CERES
325 CREAM (GRAM) TOPICAL
Qty: 90 TABLET | Refills: 3 | Status: SHIPPED | OUTPATIENT
Start: 2020-08-21

## 2020-08-21 NOTE — PROGRESS NOTES
Chief Complaint   Patient presents with   • Medicare Wellness-subsequent     sub medicare wellness, patient is requesting rolling walker with seat  try [employer]    • Hyperlipidemia     6 month f/u on labs    • Hypertension     Subjective   Emily Valdez is a 89 y.o. female who presents to the office for follow-up and review of labs. She has hypertension and her blood pressure has been controlled. She has hyperlipidemia and takes Crestor daily. She has GERD, and uses Tums as needed. She has macular degeneration and sees ophthalmology on a regular basis. She has iron deficiency anemia, and takes a daily multivitamin with iron. She has osteoarthritis and takes over-the-counter NSAIDs as needed.  She has anxiety which has been doing well.  She has chronic renal impairment, stage III.  Her baseline creatinine runs around 1.3.    She complains of increased weakness.  She is having difficulty moving around within her home.  She is requesting a prescription for a walker with wheels and a seat.  Her current walker does not have either of these, and it makes it difficult for her to safely ambulate.    She complains of a rash which occasionally breaks out on her back.  She has used triamcinolone cream in the past, and this worked well for her.  She is requesting a new prescription for this today.    The following portions of the patient's history were reviewed and updated as appropriate: allergies, current medications, past family history, past medical history, past social history, past surgical history and problem list.    Review of Systems   Constitutional: Positive for fatigue. Negative for chills and fever.   HENT: Negative for congestion, sneezing, sore throat and trouble swallowing.    Eyes: Negative for visual disturbance.   Respiratory: Negative for cough, chest tightness, shortness of breath and wheezing.    Cardiovascular: Negative for chest pain, palpitations and leg swelling.   Gastrointestinal:  "Negative for constipation, diarrhea, nausea and vomiting.   Genitourinary: Negative for dysuria, frequency and urgency.   Musculoskeletal: Negative for neck pain.   Skin: Positive for rash.   Neurological: Positive for weakness. Negative for headaches.   Psychiatric/Behavioral:        Patient denies any feelings of depression and has not felt down, hopeless or lost interest in any activities.       Objective   Vitals:    08/21/20 1331   BP: 118/68   Pulse: 70   Temp: 98.6 °F (37 °C)   TempSrc: Oral   SpO2: 99%   Weight: 69.4 kg (153 lb)   Height: 160 cm (63\")   PainSc:   3   PainLoc: Generalized      Body mass index is 27.1 kg/m².    Physical Exam   Constitutional: She is oriented to person, place, and time. She appears well-developed and well-nourished.   HENT:   Head: Normocephalic and atraumatic.   Nose: Nose normal.   Mouth/Throat: Oropharynx is clear and moist. No oropharyngeal exudate.   Eyes: Pupils are equal, round, and reactive to light. Conjunctivae and EOM are normal. Right eye exhibits no discharge. Left eye exhibits no discharge. No scleral icterus.   She is wearing dark sunglasses secondary to her macular degeneration and glaucoma   Neck: Normal range of motion. Neck supple. No thyromegaly present.   Cardiovascular: Normal rate, regular rhythm, normal heart sounds and intact distal pulses. Exam reveals no gallop and no friction rub.   No murmur heard.  Pulmonary/Chest: Effort normal and breath sounds normal. No respiratory distress. She has no wheezes. She has no rales.   Abdominal: Soft. Bowel sounds are normal. She exhibits no distension. There is no tenderness. There is no rebound and no guarding.   Musculoskeletal: She exhibits no edema.   Lymphadenopathy:     She has no cervical adenopathy.   Neurological: She is alert and oriented to person, place, and time. No cranial nerve deficit. Gait abnormal.   Patient walks with the aid of a walker   Skin: Skin is warm and dry. Rash noted.   Psychiatric: " She has a normal mood and affect. Her behavior is normal. Judgment and thought content normal.   Nursing note and vitals reviewed.      Assessment/Plan   Emily was seen today for medicare wellness-subsequent, hyperlipidemia and hypertension.    Diagnoses and all orders for this visit:    Medicare annual wellness visit, subsequent    Essential hypertension  -     Comprehensive Metabolic Panel; Future  -     T4, Free; Future  -     TSH; Future    Mixed hyperlipidemia  -     Lipid Panel; Future    Chronic renal impairment, stage 3 (moderate) (CMS/[facility])    Iron deficiency anemia secondary to inadequate dietary iron intake  -     CBC & Differential; Future  -     ferrous sulfate 325 (65 FE) MG EC tablet; Take 1 tablet by mouth Daily With Breakfast.    Primary osteoarthritis involving multiple joints  -     aFrhat    Gastroesophageal reflux disease without esophagitis    Generalized anxiety disorder    Weakness  -     Walker    Rash  -     triamcinolone (KENALOG) 0.1 % cream; Apply  topically to the appropriate area as directed 2 (Two) Times a Day.         Labs are reviewed with patient. Her hemoglobin is decreased at 9.1.  Her hematocrit is 29.2.  These are slightly lower than her previous visits.  I will start her on ferrous sulfate 325 mg daily to take in addition to the multivitamin with iron that she is currently taking.  Her creatinine is slightly elevated at 1.40.  She will try to increase fluid intake to help maintain adequate hydration.  She does not want referral to nephrology at this time.  Her LDL is at goal at 37.  She will continue with Crestor for treatment of hyperlipidemia.  Her blood pressure is controlled, and she will continue with her current blood pressure medication.  She will continue with Phenergan and Librax as needed for her GI symptoms.   She will continue with Tylenol or other OTC NSAID's as needed for her osteoarthritis.  She is given a prescription for a walker with wheels and a seat.  This  will help with ambulation both inside and outside of the home.  This will allow her to sit down if she becomes fatigued while walking.  She is given triamcinolone cream 0.1% to apply topically twice a day as needed to skin rash.      PHQ-2/PHQ-9 Depression Screening 8/21/2020   Little interest or pleasure in doing things 0   Feeling down, depressed, or hopeless 0   Trouble falling or staying asleep, or sleeping too much -   Feeling tired or having little energy -   Poor appetite or overeating -   Feeling bad about yourself - or that you are a failure or have let yourself or your family down -   Trouble concentrating on things, such as reading the newspaper or watching television -   Moving or speaking so slowly that other people could have noticed. Or the opposite - being so fidgety or restless that you have been moving around a lot more than usual -   Thoughts that you would be better off dead, or of hurting yourself in some way -   Total Score 0   If you checked off any problems, how difficult have these problems made it for you to do your work, take care of things at home, or get along with other people? -         Lab on 08/11/2020   Component Date Value Ref Range Status   • Glucose 08/11/2020 95  70 - 99 mg/dL Final   • BUN 08/11/2020 28* 7 - 23 mg/dL Final   • Creatinine 08/11/2020 1.40* 0.52 - 1.04 mg/dL Final   • Sodium 08/11/2020 138  137 - 145 mmol/L Final   • Potassium 08/11/2020 5.4* 3.4 - 5.0 mmol/L Final   • Chloride 08/11/2020 103  101 - 112 mmol/L Final   • CO2 08/11/2020 29.0  22.0 - 30.0 mmol/L Final   • Calcium 08/11/2020 9.2  8.4 - 10.2 mg/dL Final   • Total Protein 08/11/2020 7.1  6.3 - 8.6 g/dL Final   • Albumin 08/11/2020 3.70  3.50 - 5.00 g/dL Final   • ALT (SGPT) 08/11/2020 11  <=35 U/L Final   • AST (SGOT) 08/11/2020 30  14 - 36 U/L Final   • Alkaline Phosphatase 08/11/2020 78  38 - 126 U/L Final   • Total Bilirubin 08/11/2020 0.3  0.2 - 1.3 mg/dL Final   • eGFR Non African Amer 08/11/2020  35* 39 - 90 mL/min/1.73 Final   • Globulin 08/11/2020 3.4  2.3 - 3.5 gm/dL Final   • A/G Ratio 08/11/2020 1.1  1.1 - 1.8 g/dL Final   • BUN/Creatinine Ratio 08/11/2020 20.0  7.0 - 25.0 Final   • Anion Gap 08/11/2020 6.0  5.0 - 15.0 mmol/L Final   • Free T4 08/11/2020 1.47  0.93 - 1.70 ng/dL Final   • TSH 08/11/2020 2.650  0.270 - 4.200 uIU/mL Final   • LDL Cholesterol  08/11/2020 37  0 - 100 mg/dL Final   • WBC 08/11/2020 5.63  3.40 - 10.80 10*3/mm3 Final   • RBC 08/11/2020 3.12* 3.77 - 5.28 10*6/mm3 Final   • Hemoglobin 08/11/2020 9.1* 12.0 - 15.9 g/dL Final   • Hematocrit 08/11/2020 29.2* 34.0 - 46.6 % Final   • MCV 08/11/2020 93.6  79.0 - 97.0 fL Final   • MCH 08/11/2020 29.2  26.6 - 33.0 pg Final   • MCHC 08/11/2020 31.2* 31.5 - 35.7 g/dL Final   • RDW 08/11/2020 14.9  12.3 - 15.4 % Final   • RDW-SD 08/11/2020 49.1  37.0 - 54.0 fl Final   • MPV 08/11/2020 11.3  6.0 - 12.0 fL Final   • Platelets 08/11/2020 132* 140 - 450 10*3/mm3 Final   • Neutrophil % 08/11/2020 50.4  42.7 - 76.0 % Final   • Lymphocyte % 08/11/2020 30.0  19.6 - 45.3 % Final   • Monocyte % 08/11/2020 10.7  5.0 - 12.0 % Final   • Eosinophil % 08/11/2020 8.2* 0.3 - 6.2 % Final   • Basophil % 08/11/2020 0.7  0.0 - 1.5 % Final   • Neutrophils, Absolute 08/11/2020 2.84  1.70 - 7.00 10*3/mm3 Final   • Lymphocytes, Absolute 08/11/2020 1.69  0.70 - 3.10 10*3/mm3 Final   • Monocytes, Absolute 08/11/2020 0.60  0.10 - 0.90 10*3/mm3 Final   • Eosinophils, Absolute 08/11/2020 0.46* 0.00 - 0.40 10*3/mm3 Final   • Basophils, Absolute 08/11/2020 0.04  0.00 - 0.20 10*3/mm3 Final   ]        .  .

## 2020-08-21 NOTE — PROGRESS NOTES
QUICK REFERENCE INFORMATION:  The ABCs of the Annual Wellness Visit    Subsequent Medicare Wellness Visit    HEALTH RISK ASSESSMENT    1924    Recent Hospitalizations:  No hospitalization(s) within the last year..        Current Medical Providers:  Patient Care Team:  Vaibhav Mai MD as PCP - General (Family Medicine)  Francis Calvillo (Ophthalmology)        Smoking Status:  Social History     Tobacco Use   Smoking Status Former Smoker   • Last attempt to quit: 1976   • Years since quittin.0   Smokeless Tobacco Never Used       Alcohol Consumption:  Social History     Substance and Sexual Activity   Alcohol Use No       Depression Screen:   PHQ-2/PHQ-9 Depression Screening 2020   Little interest or pleasure in doing things 0   Feeling down, depressed, or hopeless 0   Trouble falling or staying asleep, or sleeping too much -   Feeling tired or having little energy -   Poor appetite or overeating -   Feeling bad about yourself - or that you are a failure or have let yourself or your family down -   Trouble concentrating on things, such as reading the newspaper or watching television -   Moving or speaking so slowly that other people could have noticed. Or the opposite - being so fidgety or restless that you have been moving around a lot more than usual -   Thoughts that you would be better off dead, or of hurting yourself in some way -   Total Score 0   If you checked off any problems, how difficult have these problems made it for you to do your work, take care of things at home, or get along with other people? -       Health Habits and Functional and Cognitive Screening:  Functional & Cognitive Status 2020   Do you have difficulty preparing food and eating? Yes   Do you have difficulty bathing yourself, getting dressed or grooming yourself? Yes   Do you have difficulty using the toilet? Yes   Do you have difficulty moving around from place to place? Yes   Do you have trouble with steps or  getting out of a bed or a chair? Yes   Current Diet Well Balanced Diet   Dental Exam Not up to date   Eye Exam Not up to date   Exercise (times per week) 0 times per week   Current Exercise Activities Include None   Do you need help using the phone?  No   Are you deaf or do you have serious difficulty hearing?  Yes   Do you need help with transportation? Yes   Do you need help shopping? Yes   Do you need help preparing meals?  Yes   Do you need help with housework?  Yes   Do you need help with laundry? Yes   Do you need help taking your medications? Yes   Do you need help managing money? No   Do you ever drive or ride in a car without wearing a seat belt? No   Have you felt unusual stress, anger or loneliness in the last month? No   Who do you live with? Spouse   If you need help, do you have trouble finding someone available to you? No   Have you been bothered in the last four weeks by sexual problems? No   Do you have difficulty concentrating, remembering or making decisions? No           Does the patient have evidence of cognitive impairment? No    Asiprin use counseling: Taking ASA appropriately as indicated      Recent Lab Results:    Visual Acuity:  No exam data present    Age-appropriate Screening Schedule:  Refer to the list below for future screening recommendations based on patient's age, sex and/or medical conditions. Orders for these recommended tests are listed in the plan section. The patient has been provided with a written plan.    Health Maintenance   Topic Date Due   • TDAP/TD VACCINES (1 - Tdap) 07/31/1935   • ZOSTER VACCINE (1 of 2) 07/31/1974   • DXA SCAN  08/22/2016   • INFLUENZA VACCINE  08/01/2020   • LIPID PANEL  08/11/2021        Subjective   History of Present Illness    Emily Valdez is a 89 y.o. female who presents for an Annual Wellness Visit.    The following portions of the patient's history were reviewed and updated as appropriate: allergies, current medications, past family  history, past medical history, past social history, past surgical history and problem list.    Outpatient Medications Prior to Visit   Medication Sig Dispense Refill   • aspirin 81 MG EC tablet 1 tablet po three times weekly     • calcium carbonate (TUMS) 500 MG chewable tablet Chew 1 tablet Every Night.     • clidinium-chlordiazePOXIDE (LIBRAX) 5-2.5 MG per capsule TAKE 1 CAPSULE BY MOUTH TWO TIMES A DAY 60 capsule 5   • docusate sodium (COLACE) 100 MG capsule Take 100 mg by mouth Every Night.     • Fish Oil oil 1 capsule 3 (three) times a day.     • lisinopril (PRINIVIL,ZESTRIL) 10 MG tablet TAKE 1 TABLET BY MOUTH DAILY 90 tablet 0   • LUMIGAN 0.01 % ophthalmic drops 1 drop to each eye q hs       • magnesium oxide (MAGOX) 400 (241.3 MG) MG tablet tablet Take 1 tablet by mouth 2 (two) times a day. 60 tablet 5   • metoprolol succinate XL (TOPROL-XL) 25 MG 24 hr tablet Take 1 tablet by mouth Daily. 90 tablet 3   • Multiple Vitamins-Minerals (CENTRUM SILVER 50+WOMEN PO) Take 1 tablet by mouth Daily.     • Multiple Vitamins-Minerals (OCUVITE PO) Take 1 tablet by mouth Daily.     • mupirocin (BACTROBAN) 2 % ointment Apply  topically 2 (Two) Times a Day. 15 g 1   • Polyvinyl Alcohol-Povidone (REFRESH OP) Apply  to eye.     • promethazine (PHENERGAN) 25 MG tablet Take 1 tablet by mouth every 6 hours as needed for nausea 30 tablet 1   • rosuvastatin (CRESTOR) 5 MG tablet TAKE 1 TABLET BY MOUTH EVERY EVENING 90 tablet 1   • triamcinolone (KENALOG) 0.1 % cream Apply  topically to the appropriate area as directed 2 (Two) Times a Day. 80 g 0   • clindamycin (CLEOCIN) 300 MG capsule Take 1 capsule by mouth 3 (Three) Times a Day. 21 capsule 0     No facility-administered medications prior to visit.        Patient Active Problem List   Diagnosis   • Primary osteoarthritis involving multiple joints   • Mixed hyperlipidemia   • Gastroesophageal reflux disease without esophagitis   • Essential hypertension   • Generalized anxiety  "disorder   • Diverticulitis of colon   • Iron deficiency anemia secondary to inadequate dietary iron intake   • Macular degeneration   • Glaucoma   • Chronic renal impairment, stage 3 (moderate) (CMS/HCC)       Advance Care Planning:  ACP Discussion Status: ACP discussion was held with the patient during this visit. Patient has an advance directive (not in EMR), copy requested.      Identification of Risk Factors:  Risk factors include: increased fall risk.    Review of Systems    Compared to one year ago, the patient feels her physical health is worse.  Compared to one year ago, the patient feels her mental health is the same.    Objective     Physical Exam    Vitals:    08/21/20 1331   BP: 118/68   Pulse: 70   Temp: 98.6 °F (37 °C)   TempSrc: Oral   SpO2: 99%   Weight: 69.4 kg (153 lb)   Height: 160 cm (63\")   PainSc:   3   PainLoc: Generalized       Patient's Body mass index is 27.1 kg/m². BMI is above normal parameters. Recommendations include: educational material.      Assessment/Plan   Patient Self-Management and Personalized Health Advice  The patient has been provided with information about: diet, exercise and weight management and preventive services including:   · Diabetes screening, see lab orders, Exercise counseling provided, Fall Risk assessment done, Nutrition counseling provided.    Visit Diagnoses:    ICD-10-CM ICD-9-CM   1. Medicare annual wellness visit, subsequent Z00.00 V70.0   2. Essential hypertension I10 401.9   3. Mixed hyperlipidemia E78.2 272.2   4. Chronic renal impairment, stage 3 (moderate) (CMS/HCC) N18.3 585.3   5. Iron deficiency anemia secondary to inadequate dietary iron intake D50.8 280.1   6. Primary osteoarthritis involving multiple joints M89.49 715.98   7. Gastroesophageal reflux disease without esophagitis K21.9 530.81   8. Generalized anxiety disorder F41.1 300.02   9. Weakness R53.1 780.79   10. Rash R21 782.1       Orders Placed This Encounter   Procedures   • Walker     " Order Specific Question:   Equipment     Answer:    Walker Folding with Wheels     Order Specific Question:   Accessories:     Answer:    Seat Attachment, Walker     Order Specific Question:   Length of Need (99 Months = Lifetime)     Answer:   99 Months = Lifetime   • Comprehensive Metabolic Panel     Standing Status:   Future     Standing Expiration Date:   8/21/2021   • T4, Free     Standing Status:   Future     Standing Expiration Date:   8/21/2021   • TSH     Standing Status:   Future     Standing Expiration Date:   8/21/2021   • Lipid Panel     Standing Status:   Future     Standing Expiration Date:   8/21/2021   • CBC & Differential     Standing Status:   Future     Standing Expiration Date:   8/21/2021     Order Specific Question:   Manual Differential     Answer:   No       Outpatient Encounter Medications as of 8/21/2020   Medication Sig Dispense Refill   • aspirin 81 MG EC tablet 1 tablet po three times weekly     • calcium carbonate (TUMS) 500 MG chewable tablet Chew 1 tablet Every Night.     • clidinium-chlordiazePOXIDE (LIBRAX) 5-2.5 MG per capsule TAKE 1 CAPSULE BY MOUTH TWO TIMES A DAY 60 capsule 5   • docusate sodium (COLACE) 100 MG capsule Take 100 mg by mouth Every Night.     • Fish Oil oil 1 capsule 3 (three) times a day.     • lisinopril (PRINIVIL,ZESTRIL) 10 MG tablet TAKE 1 TABLET BY MOUTH DAILY 90 tablet 0   • LUMIGAN 0.01 % ophthalmic drops 1 drop to each eye q hs       • magnesium oxide (MAGOX) 400 (241.3 MG) MG tablet tablet Take 1 tablet by mouth 2 (two) times a day. 60 tablet 5   • metoprolol succinate XL (TOPROL-XL) 25 MG 24 hr tablet Take 1 tablet by mouth Daily. 90 tablet 3   • Multiple Vitamins-Minerals (CENTRUM SILVER 50+WOMEN PO) Take 1 tablet by mouth Daily.     • Multiple Vitamins-Minerals (OCUVITE PO) Take 1 tablet by mouth Daily.     • mupirocin (BACTROBAN) 2 % ointment Apply  topically 2 (Two) Times a Day. 15 g 1   • Polyvinyl Alcohol-Povidone (REFRESH OP) Apply  to  eye.     • promethazine (PHENERGAN) 25 MG tablet Take 1 tablet by mouth every 6 hours as needed for nausea 30 tablet 1   • rosuvastatin (CRESTOR) 5 MG tablet TAKE 1 TABLET BY MOUTH EVERY EVENING 90 tablet 1   • triamcinolone (KENALOG) 0.1 % cream Apply  topically to the appropriate area as directed 2 (Two) Times a Day. 80 g 0   • [DISCONTINUED] triamcinolone (KENALOG) 0.1 % cream Apply  topically to the appropriate area as directed 2 (Two) Times a Day. 80 g 0   • ferrous sulfate 325 (65 FE) MG EC tablet Take 1 tablet by mouth Daily With Breakfast. 90 tablet 3   • [DISCONTINUED] clindamycin (CLEOCIN) 300 MG capsule Take 1 capsule by mouth 3 (Three) Times a Day. 21 capsule 0     No facility-administered encounter medications on file as of 8/21/2020.        Reviewed use of high risk medication in the elderly: yes  Reviewed for potential of harmful drug interactions in the elderly: yes    Follow Up:  Return in about 6 months (around 2/21/2021) for Next scheduled follow up, Or sooner as needed With Labs prior to appointment.     An After Visit Summary and PPPS with all of these plans were given to the patient.

## 2020-09-08 DIAGNOSIS — E78.5 HYPERLIPIDEMIA, UNSPECIFIED HYPERLIPIDEMIA TYPE: ICD-10-CM

## 2020-09-08 RX ORDER — ROSUVASTATIN CALCIUM 5 MG/1
TABLET, COATED ORAL
Qty: 90 TABLET | Refills: 1 | Status: SHIPPED | OUTPATIENT
Start: 2020-09-08

## 2020-09-10 DIAGNOSIS — K21.9 GASTROESOPHAGEAL REFLUX DISEASE WITHOUT ESOPHAGITIS: ICD-10-CM

## 2020-09-10 RX ORDER — CHLORDIAZEPOXIDE HYDROCHLORIDE AND CLIDINIUM BROMIDE 5; 2.5 MG/1; MG/1
CAPSULE ORAL
Qty: 60 CAPSULE | Refills: 5 | Status: SHIPPED | OUTPATIENT
Start: 2020-09-10

## 2020-10-12 ENCOUNTER — TELEMEDICINE (OUTPATIENT)
Dept: FAMILY MEDICINE CLINIC | Facility: CLINIC | Age: 85
End: 2020-10-12

## 2020-10-12 DIAGNOSIS — H35.3290 EXUDATIVE AGE-RELATED MACULAR DEGENERATION, UNSPECIFIED LATERALITY, UNSPECIFIED STAGE (HCC): Chronic | ICD-10-CM

## 2020-10-12 DIAGNOSIS — R30.0 DYSURIA: ICD-10-CM

## 2020-10-12 DIAGNOSIS — R53.1 GENERALIZED WEAKNESS: ICD-10-CM

## 2020-10-12 DIAGNOSIS — R53.1 GENERALIZED WEAKNESS: Primary | ICD-10-CM

## 2020-10-12 DIAGNOSIS — M15.9 PRIMARY OSTEOARTHRITIS INVOLVING MULTIPLE JOINTS: Primary | Chronic | ICD-10-CM

## 2020-10-12 PROCEDURE — 99214 OFFICE O/P EST MOD 30 MIN: CPT | Performed by: INTERNAL MEDICINE

## 2020-10-12 NOTE — PROGRESS NOTES
Telemedicine visit    You have chosen to receive care through a telehealth visit.  Do you consent to use a video/audio connection for your medical care today? Yes     Chief Complaint   Patient presents with   • Weakness - Generalized     Subjective   Emily Mckinley Valdez is a 89 y.o. female who is seen via telehealth video visit for follow-up.  She has macular degeneration which has had a significant impact on her eyesight.  She has difficulty ambulating and requires assistance with care.  She has been having increased weakness.  She continues to have difficulty moving around within her home.  At the last visit, I gave her a prescription for a walker with wheels and a seat.  This has helped with her ambulation initially, but now she is having weakness to the point that she is unable to push the walker.  Her appetite has been decreased.  She is having difficulty getting up to walk to the bathroom for toileting and bathing.  Her family states that she is bedbound at the current time.    Her family reports that her weakness has been gradually getting worse over the past month.  As of last Wednesday, she was unable to sit up on her own.  She has been unable to stand without assistance.  Her appetite has been decreased.  Her urine appears extremely concentrated.  The family also states that they are having difficulty lifting her out of bed or repositioning her because she is unable to provide any assistance.    The following portions of the patient's history were reviewed and updated as appropriate: allergies, current medications, past family history, past medical history, past social history, past surgical history and problem list.    Review of Systems   Constitutional: Positive for fatigue. Negative for chills and fever.   HENT: Negative for congestion, sneezing, sore throat and trouble swallowing.    Eyes: Negative for visual disturbance.   Respiratory: Negative for cough, chest tightness, shortness of  breath and wheezing.    Cardiovascular: Negative for chest pain, palpitations and leg swelling.   Gastrointestinal: Negative for abdominal pain, constipation, diarrhea, nausea and vomiting.   Genitourinary: Positive for dysuria. Negative for frequency and urgency.   Musculoskeletal: Positive for arthralgias and gait problem. Negative for neck pain.   Skin: Negative for rash.   Neurological: Positive for weakness. Negative for dizziness and headaches.   Psychiatric/Behavioral:        Patient denies any feelings of depression and has not felt down, hopeless or lost interest in any activities.   All other systems reviewed and are negative.      Objective     Physical Exam  Constitutional:       General: She is not in acute distress.     Appearance: She is well-developed.   HENT:      Head: Normocephalic and atraumatic.      Right Ear: Hearing and external ear normal.      Left Ear: Hearing and external ear normal.      Nose: Nose normal.   Eyes:      General: Lids are normal.         Right eye: No discharge.         Left eye: No discharge.      Extraocular Movements: Extraocular movements intact.   Neck:      Musculoskeletal: Normal range of motion.   Pulmonary:      Effort: Pulmonary effort is normal.   Neurological:      Mental Status: She is alert and oriented to person, place, and time.      Cranial Nerves: No cranial nerve deficit.      Motor: Weakness present.      Gait: Gait abnormal.   Psychiatric:         Behavior: Behavior normal.         Thought Content: Thought content normal.         Judgment: Judgment normal.         Assessment/Plan             Diagnoses and all orders for this visit:    1. Primary osteoarthritis involving multiple joints (Primary)    2. Exudative age-related macular degeneration, unspecified laterality, unspecified stage (CMS/[facility])    3. Generalized weakness    4. Dysuria       I will order a home health consult to evaluate and treat the patient.  She is unable to leave the home due to  the fact that she is currently bedbound.  She needs labs, and I will have home health to obtain these.  She also needs general nursing and physical therapy to help with strengthening.  She request [facility] health from [facility].  She needs a hospital bed, as this would allow her to elevate her head.  She is at risk for aspiration due to her deconditioning and weakness.  She needs to maintain the head of her bed at 30 degrees or more to help with prevention of aspiration.  She also needs a Nat lift so the family can move her in and out of bed to transfer to a bedside chair or wheelchair.  The lift will also help with transferring to the commode for toileting.  Without the lift, she is bed confined.             PHQ-2/PHQ-9 Depression Screening 8/21/2020   Little interest or pleasure in doing things 0   Feeling down, depressed, or hopeless 0   Trouble falling or staying asleep, or sleeping too much -   Feeling tired or having little energy -   Poor appetite or overeating -   Feeling bad about yourself - or that you are a failure or have let yourself or your family down -   Trouble concentrating on things, such as reading the newspaper or watching television -   Moving or speaking so slowly that other people could have noticed. Or the opposite - being so fidgety or restless that you have been moving around a lot more than usual -   Thoughts that you would be better off dead, or of hurting yourself in some way -   Total Score 0   If you checked off any problems, how difficult have these problems made it for you to do your work, take care of things at home, or get along with other people? -

## 2020-10-13 DIAGNOSIS — R53.1 GENERALIZED WEAKNESS: Primary | ICD-10-CM

## 2020-10-13 DIAGNOSIS — R26.89 DECREASED MOBILITY: ICD-10-CM

## 2020-10-13 DIAGNOSIS — R30.0 DYSURIA: Primary | ICD-10-CM

## 2020-10-13 DIAGNOSIS — R53.1 WEAKNESS: ICD-10-CM

## 2020-10-13 RX ORDER — CEPHALEXIN 500 MG/1
500 CAPSULE ORAL 4 TIMES DAILY
Qty: 28 CAPSULE | Refills: 0 | Status: SHIPPED | OUTPATIENT
Start: 2020-10-13 | End: 2020-10-20

## 2020-10-13 NOTE — TELEPHONE ENCOUNTER
Tracy from [facility] to [facility] [phone]  She called and states that she went out to see the patient today along with OT and they stated that the patient was very alert and was talking well for a while, then she started having hallucinations they state that she is wheezing and coughing and that she is dehydrated they attempted 2 xs to draw blood and did not get anything, urine was collected and states that it was a foul smell,     They stated that she is very weak and it took 3 people just to get her up out of the bed due to weakness she can barley hold her head up,     Family thinks it would be best if we can send in a script for her a hospital bed and a rudy lift to help them out sent to [facility]

## 2020-11-04 DIAGNOSIS — I10 ESSENTIAL HYPERTENSION: Chronic | ICD-10-CM

## 2020-11-05 RX ORDER — LISINOPRIL 10 MG/1
10 TABLET ORAL DAILY
Qty: 90 TABLET | Refills: 0 | Status: SHIPPED | OUTPATIENT
Start: 2020-11-05

## 2020-11-05 RX ORDER — METOPROLOL SUCCINATE 25 MG/1
25 TABLET, EXTENDED RELEASE ORAL DAILY
Qty: 90 TABLET | Refills: 0 | Status: SHIPPED | OUTPATIENT
Start: 2020-11-05